# Patient Record
Sex: MALE | Race: WHITE | NOT HISPANIC OR LATINO | Employment: OTHER | URBAN - METROPOLITAN AREA
[De-identification: names, ages, dates, MRNs, and addresses within clinical notes are randomized per-mention and may not be internally consistent; named-entity substitution may affect disease eponyms.]

---

## 2018-08-13 ENCOUNTER — TRANSFERRED RECORDS (OUTPATIENT)
Dept: PHYSICAL THERAPY | Facility: CLINIC | Age: 78
End: 2018-08-13

## 2018-08-16 ENCOUNTER — THERAPY VISIT (OUTPATIENT)
Dept: PHYSICAL THERAPY | Facility: CLINIC | Age: 78
End: 2018-08-16
Payer: MEDICARE

## 2018-08-16 DIAGNOSIS — M25.522 PAIN IN JOINT INVOLVING UPPER ARM, LEFT: ICD-10-CM

## 2018-08-16 DIAGNOSIS — M54.12 CERVICAL RADICULOPATHY: Primary | ICD-10-CM

## 2018-08-16 PROCEDURE — G8981 BODY POS CURRENT STATUS: HCPCS | Mod: GP | Performed by: PHYSICAL THERAPIST

## 2018-08-16 PROCEDURE — 97112 NEUROMUSCULAR REEDUCATION: CPT | Mod: GP | Performed by: PHYSICAL THERAPIST

## 2018-08-16 PROCEDURE — G8982 BODY POS GOAL STATUS: HCPCS | Mod: GP | Performed by: PHYSICAL THERAPIST

## 2018-08-16 PROCEDURE — 97110 THERAPEUTIC EXERCISES: CPT | Mod: GP | Performed by: PHYSICAL THERAPIST

## 2018-08-16 PROCEDURE — 97161 PT EVAL LOW COMPLEX 20 MIN: CPT | Mod: GP | Performed by: PHYSICAL THERAPIST

## 2018-08-16 NOTE — PROGRESS NOTES
[facility] Initial Evaluation  Subjective:  Patient is a 77 year old male presenting with rehab cervical spine hpi. The history is provided by the patient. No  was used.   Lake Macias is a 77 year old male with a cervical spine condition.  Condition occurred with:  Insidious onset.  Condition occurred: at home.  This is a new condition  On or about 8/8/2018, I started to have pain in the upper arm and into the elbow/ forearm.  I don't remember anything I did.  I was lifting a water bucket out the rain barrel but didn't notice any pain. The MD feels that is it coming from my neck..    Site of Pain: dosen't start in the neck.  Radiates to:  Elbow left and upper arm left (moves to the UT, scapular).  Pain is described as aching and is constant and reported as 5/10 and 6/10.  Associated with: drop something from the arms. Pain is worse in the P.M. and worse during the night.  Symptoms are exacerbated by carrying, driving, lifting and lying down (turning the head ) and relieved by NSAID's (arm positioning).  Since onset symptoms are unchanged.  Special tests:  X-ray (negative).  Previous treatment: none.    General health as reported by patient is good.  Pertinent medical history includes:  Heart problems (malaria).  Medical allergies: yes (latex, codine).  Surgical history: left groin hernia, open heart (tri) 2009.  Current medications:  High blood pressure medication (statin).  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    Employment tasks: takes care of wife, house work, yard.    Barriers: house.    Red flags:  None as reported by the patient.                        Objective:  Standing Alignment:    Cervical/Thoracic:  Forward head and thoracic kyphosis increased (poor sitting posture)  Shoulder/UE:  Rounded shoulders  Lumbar:  Lordosis decr                Flexibility/Screens:   Positive screens:  Cervical and Thoracic (tightness)  Upper  Extremity:    Decreased left upper extremity flexibility at:  Pectoralis Major and Pectoralis Minor    Decreased right upper extremity flexibility present at:  Pectoralis Major and Pectoralis Minor    Spine:  Decreased left spine flexibility:  Scalenes; Upper Trap and Levator                    Cervical/Thoracic Evaluation    AROM:  AROM Cervical:    Flexion:          WFL no change  Extension:       Minimal and arm pain   Rotation:         Left: minimal and painful     Right: moderate  Side Bend:      Left:     Right:   AROM Thoracic:    Flexion:               Extension:          Minimal movement  Rotation:            Left: minimal-moderate movement     Right: minimal-moderate movement        Cervical Myotomes:    C1-2 (Neck Flex): Left:  5    Right: 5  C3 (neck side bend): Left: 5    Right: 5  C4 (shrug):  Left: 5    Right: 5  C5 (Deltoid):  Left: 5    Right: 5  C6 (Biceps):  Left: 5    Right: 5  C7 (Triceps):  Left: 5-    Right: 5  C8 (Thumb Ext): Left: 5    Right: 5  T1 (Intrinsics): Left: 5    Right: 5        Cervical Palpation:  : left first rib.  Tenderness present at Left:    Scalenes; Upper Trap; Levator and Facet      Cervical Stability/Joint Clearing:    Left positive at:1st Rib; Provocation and Mobility    Spinal Segmental Conclusions:    Level:  Hypo at T1, T2, T3, T4, T5 and T6                                                General     ROS    Assessment/Plan:    Patient is a 77 year old male with left arm pain  complaints.    Patient has the following significant findings with corresponding treatment plan.                Diagnosis 1:  Cervical radiculopathy/left arm pain  Pain -  manual therapy, self management, education, directional preference exercise and home program  Decreased ROM/flexibility - manual therapy, therapeutic exercise, therapeutic activity and home program  Decreased joint mobility - manual therapy, therapeutic exercise, therapeutic activity and home program  Decreased strength -  therapeutic exercise, therapeutic activities and home program  Impaired muscle performance - neuro re-education and home program  Decreased function - therapeutic activities and home program  Impaired posture - neuro re-education, therapeutic activities and home program  Evaluation ongoing     Therapy Evaluation Codes:   1) History comprised of:   Personal factors that impact the plan of care:      Living environment, Profession and caring for wife.    Comorbidity factors that impact the plan of care are:      Heart problems and heart surgery.     Medications impacting care: None.  2) Examination of Body Systems comprised of:   Body structures and functions that impact the plan of care:      Cervical spine, Elbow, Shoulder and Thoracic Spine.   Activity limitations that impact the plan of care are:      Driving, Dressing, Lifting, Reading/Computer work and Sleeping.  3) Clinical presentation characteristics are:   Stable/Uncomplicated.  4) Decision-Making    Low complexity using standardized patient assessment instrument and/or measureable assessment of functional outcome.  Cumulative Therapy Evaluation is: Low complexity.    Previous and current functional limitations:  (See Goal Flow Sheet for this information)    Short term and Long term goals: (See Goal Flow Sheet for this information)     Communication ability:  Patient appears to be able to clearly communicate and understand verbal and written communication and follow directions correctly.  Treatment Explanation - The following has been discussed with the patient:   RX ordered/plan of care  Possible risks and side effects  This patient would benefit from PT intervention to resume normal activities.   Rehab potential is good.    Frequency:  1 X week, once daily  Duration:  for 9 weeks  Discharge Plan:  Achieve all LTG.  Independent in home treatment program.    Please refer to the daily flowsheet for treatment today, total treatment time and time spent  performing 1:1 timed codes.

## 2018-08-16 NOTE — MR AVS SNAPSHOT
After Visit Summary   8/16/2018    Lake Macias    MRN: [id number]           Patient Information     Date Of Birth          1940        Visit Information        Provider Department      8/16/2018 2:50 PM Halima Corona PT [facility]        Today's Diagnoses     Cervical radiculopathy    -  1    Pain in joint involving upper arm, left           Follow-ups after your visit        Your next 10 appointments already scheduled     Aug 24, 2018 12:50 PM CDT   WILFRIDO Extremity with Lorelei Bateman [facility] Physical Therapy ([facility])    [address] [place] [address] [address] [place] [id number]   [phone]            Aug 31, 2018  1:30 PM CDT   WILFRIDO Extremity with Lorelei Bateman [facility] ([facility])    [address] [place] [address] [address] [place] [address]   [phone]            Sep 07, 2018  1:30 PM CDT   WILFRIDO Extremity with Lorelei Bateman [facility] Therapy ([facility])    [address] [place] [address] [address] [place] [address]   [phone]            Sep 14, 2018  1:30 PM CDT   WILFRIDO Extremity with Lorelei Bateman [facility] Therapy ([facility])    [address] [place] [address] [address] [place] [address]   [phone]              Who to contact     If you have questions or need follow up information about today's clinic visit or your schedule please contact [facility] PHYSICAL THERAPY directly at [phone].  Normal or non-critical lab and imaging results will be communicated to you by MyChart, letter or phone within 4 business days after the clinic has received the results. If you do not hear from us within 7 days, please  contact the clinic through [employer] or phone. If you have a critical or abnormal lab result, we will notify you by phone as soon as possible.  Submit refill requests through [employer] or call your pharmacy and they will forward the refill request to us. Please allow 3 business days for your refill to be completed.          Additional Information About Your Visit        Care EveryWhere ID     This is your Care EveryWhere ID. This could be used by other organizations to access your [facility] medical records  [id number]         Blood Pressure from Last 3 Encounters:   No data found for BP    Weight from Last 3 Encounters:   No data found for Wt              We Performed the Following     HC PT EVAL, LOW COMPLEXITY     WILFRIDO CERT REPORT     W[facility]O INITIAL EVAL REPORT     NEUROMUSCULAR RE-EDUCATION     THERAPEUTIC EXERCISES        Primary Care Provider Office Phone # Fax #    Boris Feliciano [phone]       [facility] [address]  [facility] [address]        Equal Access to Services     TAHMINA [facility] HospitalASHLEY : Hadii aad ku hadasho Soomaali, waaxda luqadaha, qaybta kaalmada adeegyada, waxay idiin hayaan adereggie garg . So [facility] [phone].    ATENCIÓN: Si habla español, tiene a fournier disposición servicios gratuitos de asistencia lingüística. Patricio al [phone].    We comply with applicable federal civil rights laws and [place] laws. We do not discriminate on the basis of race, color, national origin, age, disability, sex, sexual orientation, or gender identity.            Thank you!     Thank you for choosing [facility]ICAL THERAP[facility]  for your care. Our goal is always to provide you with excellent care. Hearing back from our patients is one way we can continue to improve our services. Please take a few minutes to complete the written survey that you may receive in the mail after your visit with us. Thank you!             Your Updated Medication List - Protect  others around you: Learn how to safely use, store and throw away your medicines at www.disposemymeds.org.      Notice  As of 8/16/2018 11:59 PM    You have not been prescribed any medications.

## 2018-08-18 PROBLEM — M25.522 PAIN IN JOINT INVOLVING UPPER ARM, LEFT: Status: ACTIVE | Noted: 2018-08-18

## 2018-08-18 PROBLEM — M54.12 CERVICAL RADICULOPATHY: Status: ACTIVE | Noted: 2018-08-18

## 2018-08-24 ENCOUNTER — THERAPY VISIT (OUTPATIENT)
Dept: PHYSICAL THERAPY | Facility: CLINIC | Age: 78
End: 2018-08-24
Payer: MEDICARE

## 2018-08-24 DIAGNOSIS — M54.12 CERVICAL RADICULOPATHY: ICD-10-CM

## 2018-08-24 DIAGNOSIS — M25.522 PAIN IN JOINT INVOLVING UPPER ARM, LEFT: ICD-10-CM

## 2018-08-24 PROCEDURE — 97112 NEUROMUSCULAR REEDUCATION: CPT | Mod: GP | Performed by: PHYSICAL THERAPY ASSISTANT

## 2018-08-24 PROCEDURE — 97110 THERAPEUTIC EXERCISES: CPT | Mod: GP | Performed by: PHYSICAL THERAPY ASSISTANT

## 2018-08-31 ENCOUNTER — THERAPY VISIT (OUTPATIENT)
Dept: PHYSICAL THERAPY | Facility: CLINIC | Age: 78
End: 2018-08-31
Payer: MEDICARE

## 2018-08-31 DIAGNOSIS — M54.12 CERVICAL RADICULOPATHY: ICD-10-CM

## 2018-08-31 DIAGNOSIS — M25.522 PAIN IN JOINT INVOLVING UPPER ARM, LEFT: ICD-10-CM

## 2018-08-31 PROCEDURE — 97110 THERAPEUTIC EXERCISES: CPT | Mod: GP | Performed by: PHYSICAL THERAPY ASSISTANT

## 2018-08-31 PROCEDURE — 97112 NEUROMUSCULAR REEDUCATION: CPT | Mod: GP | Performed by: PHYSICAL THERAPY ASSISTANT

## 2018-08-31 PROCEDURE — 97140 MANUAL THERAPY 1/> REGIONS: CPT | Mod: GP | Performed by: PHYSICAL THERAPY ASSISTANT

## 2018-09-14 ENCOUNTER — THERAPY VISIT (OUTPATIENT)
Dept: PHYSICAL THERAPY | Facility: CLINIC | Age: 78
End: 2018-09-14
Payer: MEDICARE

## 2018-09-14 DIAGNOSIS — M54.12 CERVICAL RADICULOPATHY: ICD-10-CM

## 2018-09-14 DIAGNOSIS — M25.522 PAIN IN JOINT INVOLVING UPPER ARM, LEFT: ICD-10-CM

## 2018-09-14 PROCEDURE — G8982 BODY POS GOAL STATUS: HCPCS | Mod: GP | Performed by: PHYSICAL THERAPIST

## 2018-09-14 PROCEDURE — 97110 THERAPEUTIC EXERCISES: CPT | Mod: GP | Performed by: PHYSICAL THERAPY ASSISTANT

## 2018-09-14 PROCEDURE — 97112 NEUROMUSCULAR REEDUCATION: CPT | Mod: GP | Performed by: PHYSICAL THERAPY ASSISTANT

## 2018-09-14 PROCEDURE — G8983 BODY POS D/C STATUS: HCPCS | Mod: GP | Performed by: PHYSICAL THERAPIST

## 2018-09-14 NOTE — PROGRESS NOTES
Subjective:  HPI                    Objective:  System    Physical Exam    General     ROS    Assessment/Plan:    DISCHARGE REPORT    Progress reporting period is from 8/16/18 to 9/14/18.      SUBJECTIVE  Subjective changes noted by patient:  Pt has been seen for 3 PT sessions reporting full recovery. Currently has no complaints of pain in neck or L arm. Pt has been consistent with HEP and improving posture.   Full performance of ADL's and household work.    Current Pain level: 0/10    Previous pain level was:  6/10     Changes in function:  Yes (See Goal flowsheet attached for changes in current functional level)     Adverse reaction to treatment or activity: None     OBJECTIVE  Changes noted in objective findings:   CROM has improved: all motions WNL and painfree.   Cervical myotomes normal.   No tenderness with palpation to UT/SO regions.   Improved posture, occasional fwd head with pt able to correct without cues, instructed pt to avoid crossing arms in front of body as that pulls the shoulders and head forward.

## 2018-09-14 NOTE — LETTER
[facility] PHYSICAL THERAPY  [address]  [place] [address]  [phone]    2018    Re: Lake Macias   :   1940  MRN:  [id number]   REFERRING PHYSICIAN:   Boris Feliciano    [facility] PHYSICAL THERAPY    Date of Initial Evaluation:  2018  Visits:  Rxs Used: 4  Reason for Referral:     Cervical radiculopathy  Pain in joint involving upper arm, left    DISCHARGE REPORT  Progress reporting period is from 18 to 18.      SUBJECTIVE  Subjective changes noted by patient:  Pt has been seen for 3 PT sessions reporting full recovery. Currently has no complaints of pain in neck or L arm. Pt has been consistent with HEP and improving posture.   Full performance of ADL's and household work.    Current Pain level: 0/10    Previous pain level was:  6/10     Changes in function:  Yes (See Goal flowsheet attached for changes in current functional level)     Adverse reaction to treatment or activity: None     OBJECTIVE  Changes noted in objective findings:   CROM has improved: all motions WNL and painfree.   Cervical myotomes normal.   No tenderness with palpation to UT/SO regions.   Improved posture, occasional fwd head with pt able to correct without cues, instructed pt to avoid crossing arms in front of body as that pulls the shoulders and head forward.      ASSESSMENT/PLAN  Updated problem list and treatment plan: Diagnosis 1:  Cervical radiculopathy with L upper extremity pain:  none  STG/LTGs have been met:  Yes (See Goal flow sheet completed today.)  Progress toward STG/LTGs have been made:  Yes (See Goal flow sheet completed today.)  Assessment of Progress: The patient's condition is improving.  Patient is meeting short term goals and is progressing towards long term goals.  Self Management Plans:  Patient is independent in a home treatment program.  Patient is independent in self  management of symptoms.I have re-evaluated this patient and find that the nature, scope, duration and intensity of   Re: Lake Macias   :   1940    the therapy is appropriate for the medical condition of the patient.  Lake continues to require the following intervention to meet STG and LT's:  PT intervention is no longer required to meet STG/LTG.    Recommendations:  This patient is ready to be discharged from therapy and continue their home treatment program.  The progress note/discharge summary was written in collaboration with and reviewed by the physical therapist.      Thank you for your referral.    INQUIRIES  Therapist: Zenia Jj PT  [facility]  [address] [address]  Phone: [phone]  Fax: [phone]

## 2018-09-14 NOTE — MR AVS SNAPSHOT
After Visit Summary   9/14/2018    Lake Macias    MRN: [id number]           Patient Information     Date Of Birth          1940        Visit Information        Provider Department      9/14/2018 1:30 PM Lorelei Bateman PTA [facility] Physical Therapy        Today's Diagnoses     Cervical radiculopathy        Pain in joint involving upper arm, left           Follow-ups after your visit        Who to contact     If you have questions or need follow up information about today's clinic visit or your schedule please contact [facility] directly at [phone].  Normal or non-critical lab and imaging results will be communicated to you by MyChart, letter or phone within 4 business days after the clinic has received the results. If you do not hear from us within 7 days, please contact the clinic through MyChart or phone. If you have a critical or abnormal lab result, we will notify you by phone as soon as possible.  Submit refill requests through [employer] or call your pharmacy and they will forward the refill request to us. Please allow 3 business days for your refill to be completed.          Additional Information About Your Visit        Care EveryWhere ID     This is your Care EveryWhere ID. This could be used by other organizations to access your [facility] medical records  [id number]         Blood Pressure from Last 3 Encounters:   No data found for BP    Weight from Last 3 Encounters:   No data found for Wt              We Performed the Following     NEUROMUSCULAR RE-EDUCATION     THERAPEUTIC EXERCISES        Primary Care Provider Office Phone # Fax #    Boris MCCLELLAN Jodie [phone]       [address] [address]        Equal Access to Services     TAHMINA ELKINS : Edie Restrepo, lalito carnes, karla talley  zackery silvamimishivam barretoAjaacelestina ah. So [facility] [phone].    ATENCIÓN: Si habla primitivo, tiene a fournier disposición servicios gratuitos de asistencia lingüística. Patricio al [phone].    We comply with applicable federal civil rights laws and [place] laws. We do not discriminate on the basis of race, color, national origin, age, disability, sex, sexual orientation, or gender identity.            Thank you!     Thank you for choosing [facility] PHYSICAL THERAP[facility]  for your care. Our goal is always to provide you with excellent care. Hearing back from our patients is one way we can continue to improve our services. Please take a few minutes to complete the written survey that you may receive in the mail after your visit with us. Thank you!             Your Updated Medication List - Protect others around you: Learn how to safely use, store and throw away your medicines at www.disposemymeds.org.      Notice  As of 9/14/2018  2:53 PM    You have not been prescribed any medications.

## 2018-09-14 NOTE — PROGRESS NOTES
Subjective:  HPI                    Objective:  System    Physical Exam    General     ROS    Assessment/Plan:    ASSESSMENT/PLAN  Updated problem list and treatment plan: Diagnosis 1:  Cervical radiculopathy with L upper extremity pain:  none  STG/LTGs have been met:  Yes (See Goal flow sheet completed today.)  Progress toward STG/LTGs have been made:  Yes (See Goal flow sheet completed today.)  Assessment of Progress: The patient's condition is improving.  Patient is meeting short term goals and is progressing towards long term goals.  Self Management Plans:  Patient is independent in a home treatment program.  Patient is independent in self management of symptoms.  I have re-evaluated this patient and find that the nature, scope, duration and intensity of the therapy is appropriate for the medical condition of the patient.  Lake continues to require the following intervention to meet STG and LT's:  PT intervention is no longer required to meet STG/LTG.    Recommendations:  This patient is ready to be discharged from therapy and continue their home treatment program.  The progress note/discharge summary was written in collaboration with and reviewed by the physical therapist.    Please refer to the daily flowsheet for treatment today, total treatment time and time spent performing 1:1 timed codes.

## 2022-03-21 NOTE — LETTER
DEPARTMENT OF HEALTH AND HUMAN SERVICES  CENTERS FOR MEDICARE & MEDICAID SERVICES    PLAN/UPDATED PLAN OF PROGRESS FOR OUTPATIENT REHABILITATION    PATIENTS NAME:  Lake Macias   : 1940  PROVIDER NUMBER:    [id number]    HICN:  [id number]    PROVIDER NAME: [place] [facility] PHYSICAL [facility]ERAPY    MEDICAL RECORD NUMBER: [id number]     START OF CARE DATE:  SOC Date: 18   TYPE:  PT    PRIMARY/TREATMENT DIAGNOSIS: (Pertinent Medical Diagnosis)   Cervical radiculopathy  Pain in joint involving upper arm, left    VISITS FROM START OF CARE:  Rxs Used: 1     [facility] Initial Evaluation    Subjective:  Patient is a 77 year old male presenting with rehab cervical spine hpi. The history is provided by the patient. No  was used.   Lake Macias is a 77 year old male with a cervical spine condition.  Condition occurred with:  Insidious onset.  Condition occurred: at home.  This is a new condition  On or about 2018, I started to have pain in the upper arm and into the elbow/ forearm.  I don't remember anything I did.  I was lifting a water bucket out the rain barrel but didn't notice any pain. The MD feels that is it coming from my neck..    Site of Pain: dosen't start in the neck.  Radiates to:  Elbow left and upper arm left (moves to the UT, scapular).  Pain is described as aching and is constant and reported as 5/10 and 6/10.  Associated with: drop something from the arms. Pain is worse in the P.M. and worse during the night.  Symptoms are exacerbated by carrying, driving, lifting and lying down (turning the head ) and relieved by NSAID's (arm positioning).  Since onset symptoms are unchanged.  Special tests:  X-ray (negative).  Previous treatment: none.    General health as reported by patient is good.  Pertinent medical history includes:  Heart problems (malaria).  Medical allergies: yes (latex, codine).  Surgical history: left  MD at the bedside attempting to assess left shoulder and manipulate   groin hernia, open heart (tri) 2009.  Current medications:  High blood pressure medication (statin).  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    Employment tasks: takes care of wife, house work, yard.  Barriers: house.  Red flags:  None as reported by the patient.                Objective:  Standing Alignment:    Cervical/Thoracic:  Forward head and thoracic kyphosis increased (poor sitting posture)  Shoulder/UE:  Rounded shoulders  Lumbar:  Lordosis decr  Flexibility/Screens:   Positive screens:  Cervical and Thoracic (tightness)  PATIENTS NAME:  Lake Macias   : 1940    Upper Extremity:    Decreased left upper extremity flexibility at:  Pectoralis Major and Pectoralis Minor  Decreased right upper extremity flexibility present at:  Pectoralis Major and Pectoralis Minor  Spine:  Decreased left spine flexibility:  Scalenes; Upper Trap and Levator      Cervical/Thoracic Evaluation  AROM:  AROM Cervical:  Flexion:          WFL no change  Extension:       Minimal and arm pain   Rotation:         Left: minimal and painful     Right: moderate  Side Bend:      Left:     Right:   AROM Thoracic:  Flexion:               Extension:          Minimal movement  Rotation:            Left: minimal-moderate movement     Right: minimal-moderate movement    Cervical Myotomes:    C1-2 (Neck Flex): Left:  5    Right: 5  C3 (neck side bend): Left: 5    Right: 5  C4 (shrug):  Left: 5    Right: 5  C5 (Deltoid):  Left: 5    Right: 5  C6 (Biceps):  Left: 5    Right: 5  C7 (Triceps):  Left: 5-    Right: 5  C8 (Thumb Ext): Left: 5    Right: 5  T1 (Intrinsics): Left: 5    Right: 5  Cervical Palpation:  : left first rib.  Tenderness present at Left:    Scalenes; Upper Trap; Levator and Facet  Cervical Stability/Joint Clearing:    Left positive at:1st Rib; Provocation and Mobility  Spinal Segmental Conclusions:    Level:  Hypo at T1, T2, T3, T4, T5 and T6    Assessment/Plan:    Patient is a 77 year old  male with left arm pain  complaints.    Patient has the following significant findings with corresponding treatment plan.                Diagnosis 1:  Cervical radiculopathy/left arm pain  Pain -  manual therapy, self management, education, directional preference exercise and home program  Decreased ROM/flexibility - manual therapy, therapeutic exercise, therapeutic activity and home program  Decreased joint mobility - manual therapy, therapeutic exercise, therapeutic activity and home program  Decreased strength - therapeutic exercise, therapeutic activities and home program  Impaired muscle performance - neuro re-education and home program  Decreased function - therapeutic activities and home program  Impaired posture - neuro re-education, therapeutic activities and home program  Evaluation ongoing       PATIENTS NAME:  Lake Macias   : 1940    Therapy Evaluation Codes:   1) History comprised of:   Personal factors that impact the plan of care:      Living environment, Profession and caring for wife.    Comorbidity factors that impact the plan of care are:      Heart problems and heart surgery.     Medications impacting care: None.  2) Examination of Body Systems comprised of:   Body structures and functions that impact the plan of care:      Cervical spine, Elbow, Shoulder and Thoracic Spine.   Activity limitations that impact the plan of care are:      Driving, Dressing, Lifting, Reading/Computer work and Sleeping.  3) Clinical presentation characteristics are:   Stable/Uncomplicated.  4) Decision-Making    Low complexity using standardized patient assessment instrument and/or measureable assessment of functional outcome.  Cumulative Therapy Evaluation is: Low complexity.    Previous and current functional limitations:  (See Goal Flow Sheet for this information)    Short term and Long term goals: (See Goal Flow Sheet for this information)     Communication ability:  Patient appears to be able to  "clearly communicate and understand verbal and written communication and follow directions correctly.  Treatment Explanation - The following has been discussed with the patient:   RX ordered/plan of care  Possible risks and side effects  This patient would benefit from PT intervention to resume normal activities.   Rehab potential is good.    Frequency:  1 X week, once daily  Duration:  for 9 weeks  Discharge Plan:  Achieve all LTG.  Independent in home treatment program.    Caregiver Signature/Credentials _____________________________ Date ________       Treating Provider: Halima Corona, PT     I have reviewed and certified the need for these services and plan of treatment while under my care.      PHYSICIAN'S SIGNATURE:   _________________________________________  Date___________   Boris Feliciano MD    Certification period:  Beginning of Cert date period: 08/16/18 to  End of Cert period date: 11/13/18   Functional Level Progress Report: Please see attached \"Goal Flow sheet for Functional level.\"  ____X____ Continue Services or       ________ DC Services              Service dates: From  SOC Date: 08/16/18 date to present                         "

## 2022-04-21 ENCOUNTER — TRANSFERRED RECORDS (OUTPATIENT)
Dept: HEALTH INFORMATION MANAGEMENT | Facility: CLINIC | Age: 82
End: 2022-04-21

## 2022-04-21 LAB
CHOLESTEROL (EXTERNAL): 155 MG/DL (ref 0–200)
CREATININE (EXTERNAL): 0.8 MG/DL (ref 0.7–1.2)
GFR ESTIMATED (EXTERNAL): 90 ML/MIN/1.73M2
HBA1C MFR BLD: 5.7 % (ref 4–6)
HDLC SERPL-MCNC: 44 MG/DL
HEP C HIM: NORMAL
LDL CHOLESTEROL CALCULATED (EXTERNAL): 80 MG/DL
NON HDL CHOLESTEROL (EXTERNAL): 111 MG/DL
TRIGLYCERIDES (EXTERNAL): 155 MG/DL

## 2023-02-14 ENCOUNTER — TRANSFERRED RECORDS (OUTPATIENT)
Dept: HEALTH INFORMATION MANAGEMENT | Facility: CLINIC | Age: 83
End: 2023-02-14

## 2023-04-06 ENCOUNTER — TRANSCRIBE ORDERS (OUTPATIENT)
Dept: OTHER | Age: 83
End: 2023-04-06

## 2023-04-06 DIAGNOSIS — M43.16 SPONDYLOLISTHESIS, LUMBAR REGION: Primary | ICD-10-CM

## 2023-04-10 NOTE — TELEPHONE ENCOUNTER
SPINE PATIENTS - NEW PROTOCOL PREVISIT    RECORDS RECEIVED FROM: external   REASON FOR VISIT: Spondylolisthesis, lumbar region   Date of Appt: 4/14/23   NOTES (FOR ALL VISITS) STATUS DETAILS   OFFICE NOTE from referring provider Received Dr Milind Mota @ [facility] Neurosurgery:  3/23/23   MEDICATION LIST Received    IMAGING  (FOR ALL VISITS)     MRI (HEAD, NECK, SPINE) [facility]  MRI Lumbar Spine 3/4/23   XRAY (SPINE) *NEUROSURGERY* [facility]:  XR Lumbar Spine 3/23/23

## 2023-04-12 ENCOUNTER — TELEPHONE (OUTPATIENT)
Dept: NEUROSURGERY | Facility: CLINIC | Age: 83
End: 2023-04-12
Payer: COMMERCIAL

## 2023-04-12 NOTE — TELEPHONE ENCOUNTER
[facility] Call Center    Phone Message    May a detailed message be left on voicemail: yes     Reason for Call: Other: Patient returned call stating that [facility].    CCN2  Referral number [id number]  [facility] phone number [phone]    Action Taken: Message routed to:  [facility] ([facility]): [facility] Neuro[facility]    Travel Screening: Not Applicable

## 2023-04-13 DIAGNOSIS — M43.16 SPONDYLOLISTHESIS, LUMBAR REGION: Primary | ICD-10-CM

## 2023-04-14 ENCOUNTER — OFFICE VISIT (OUTPATIENT)
Dept: NEUROSURGERY | Facility: CLINIC | Age: 83
End: 2023-04-14
Payer: COMMERCIAL

## 2023-04-14 ENCOUNTER — PREP FOR PROCEDURE (OUTPATIENT)
Dept: NEUROSURGERY | Facility: CLINIC | Age: 83
End: 2023-04-14

## 2023-04-14 ENCOUNTER — PRE VISIT (OUTPATIENT)
Dept: NEUROSURGERY | Facility: CLINIC | Age: 83
End: 2023-04-14
Payer: COMMERCIAL

## 2023-04-14 VITALS
SYSTOLIC BLOOD PRESSURE: 133 MMHG | DIASTOLIC BLOOD PRESSURE: 80 MMHG | OXYGEN SATURATION: 97 % | HEART RATE: 79 BPM | WEIGHT: 181 LBS

## 2023-04-14 DIAGNOSIS — M48.062 LUMBAR STENOSIS WITH NEUROGENIC CLAUDICATION: ICD-10-CM

## 2023-04-14 DIAGNOSIS — M43.16 SPONDYLOLISTHESIS, LUMBAR REGION: Primary | ICD-10-CM

## 2023-04-14 DIAGNOSIS — M54.41 ACUTE BILATERAL LOW BACK PAIN WITH BILATERAL SCIATICA: Primary | ICD-10-CM

## 2023-04-14 DIAGNOSIS — M54.42 ACUTE BILATERAL LOW BACK PAIN WITH BILATERAL SCIATICA: Primary | ICD-10-CM

## 2023-04-14 DIAGNOSIS — M54.42 ACUTE BILATERAL LOW BACK PAIN WITH BILATERAL SCIATICA: ICD-10-CM

## 2023-04-14 DIAGNOSIS — M54.41 ACUTE BILATERAL LOW BACK PAIN WITH BILATERAL SCIATICA: ICD-10-CM

## 2023-04-14 PROCEDURE — 99204 OFFICE O/P NEW MOD 45 MIN: CPT | Performed by: PHYSICIAN ASSISTANT

## 2023-04-14 RX ORDER — TOLTERODINE 4 MG/1
1 CAPSULE, EXTENDED RELEASE ORAL DAILY
COMMUNITY
Start: 2022-04-21

## 2023-04-14 RX ORDER — NITROGLYCERIN 0.4 MG/1
0.4 TABLET SUBLINGUAL PRN
COMMUNITY

## 2023-04-14 RX ORDER — ROSUVASTATIN CALCIUM 10 MG/1
1 TABLET, COATED ORAL AT BEDTIME
COMMUNITY
Start: 2023-02-28

## 2023-04-14 RX ORDER — ATENOLOL 25 MG/1
1 TABLET ORAL DAILY
COMMUNITY
Start: 2023-02-25

## 2023-04-14 RX ORDER — LOSARTAN POTASSIUM 25 MG/1
25 TABLET ORAL DAILY
COMMUNITY

## 2023-04-14 RX ORDER — LORATADINE 10 MG/1
10 TABLET ORAL DAILY
COMMUNITY

## 2023-04-14 ASSESSMENT — PAIN SCALES - GENERAL: PAINLEVEL: MODERATE PAIN (4)

## 2023-04-14 NOTE — PATIENT INSTRUCTIONS
"Dr Howell has recommended surgery (bilateral L4/5 minimally invasive microdiscectomy) and you have indicated a willingness to move forward with scheduling.    A member of our scheduling team will call you soon to coordinate a date and time, and will help arrange a visit with our anesthesia doctors in \"[facility]\" to screen for medical problems and to check blood tests prior to surgery.  Please be aware, we are currently scheduling about 4-6 weeks out.  Once a surgery date has been established our office will mail surgical information to your home.  If you have questions, please call Dr Lynda Mayfield's RN Care Coordinator at [phone]       [facility]    "

## 2023-04-14 NOTE — LETTER
4/14/2023       RE: Lake Macias  [address]  [facility] [address]     Dear Colleague,    Thank you for referring your patient, Lake Macias, to the [facility] NEUROSURGERY CLINIC [place] at [facility]. Please see a copy of my visit note below.        Neurosurgery Clinic Note    Chief Complaint: low back pain with sciatica    History of Present Illness:  It was a pleasure to evaluate Lake Macias in clinic today at the kind referral of Milind Mota MD  [address]  [place] [address].    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      Patient notes he started having more low back pain when he turned 83 y/o (end of November).  His more chronic back pain turned sharp.  He notes this getting worse as he moves around throughout the day.  He endorses pain in his buttocks and hips bilaterally, but denies any pain radiating down his thighs or legs.  He denies leg weakness or numbness/tingling.  The pain ranges from aching to sharp. Sitting too long hurts.  Leaning over feels better.  He finds himself naturally progressing to leaning over throughout the day.  He is very limited on how far he can walk--maybe 1/2 jasmyn before needing to rest because of pain.  He rests for 15-30 minutes and then can resume. He denies any falls or other trauma. He has not had any prior lumbar surgeries.    In the last week he is getting numbness/tingling in both arms/hands and all fingers.  He denies dropping things or using his fingers.  He denies balance issues, weakness in his BUE or bowel/bladder issues.  He denies neck pain, but does feel neck stiffness.  His records indicate C4-7 DDD with mild central stenosis at  C4-6 in 2015 on MRI (important for anesthesia to know for intubation purposes).      He takes care of his wife who is on hospice care in living room.      He declined PT prior to getting MRI because he did not want to damage anything more; he is as active as he can be on is own right now.     No past medical history on file.    No past surgical history on file.    Social History     Socioeconomic History    Marital status:        family history is not on file.       IMAGING   Imaging independently reviewed.    Lumbar MRI  3/4/23 - transitional anatomy--last full disc space is L5/S1.  L4/5 anterolisthesis grade 1, severe CCS and FS with disc osteophyte complex.               Lumbar XR dynamic 3/23/23 - no substantial dynamic motion in lumbar spine            Nicotine Usage:  No     Physical Exam   /80   Pulse 79   Wt 82.1 kg (181 lb)   SpO2 97%     Constitutional: Appears well-developed and well-nourished. Cooperative. No acute distress.   HENT:   Head: Normocephalic and atraumatic.   Eyes: Conjunctivae are normal.  Neck: Neck supple. No tracheal deviation present.  Cardiovascular: Normal rate and regular rhythm.    Pulmonary/Chest: Effort normal and breath sounds normal.  Abdominal: Exhibits no obvious distension.   Musculoskeletal: Able to move all extremities.  No involuntary motor movements. No C/T/L spine tenderness to palpation.    Lumbar flexion-extension range of motion: stiffness with ROM, but sharp pain not reproduced today with F/E, lateral bending or facet loading. Negative straight leg. Negative Faver.   Cervical flexion/extension range of motion: FROM w/o pain. N/T not present today.  Skin: Skin is warm, dry and intact.   Psychiatric: Normal mood and affect. Speech is normal and behavior is normal.    Neurological:  Alert, NAD, and oriented to person, place, and time.   No cranial nerve deficit   Gait: normal gait; +imbalance with tandem walking    Strength (L/R)  5/5 Deltoid  5/5  Bicep  5/5 Tricep  5/5 Handgrip    5/5 Hip Flexion  5/5 Knee Extension  5/5 Ankle Dorsiflexion  5/5 Extensor Hallucis Longus  5/5 Plantar Flexion    Reflexes (L/R)  1+1+ Bicep  1+/1+ Brachioradialis  2+/2+ Patellar  1+/1+ Ankle    No Lhermitte's  No Spurling's  No Juan C's   No ankle clonus    Sensation: SILT  No SI Joint TTP or GT bursal TTP bilaterally.    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     His symptoms and imaging are consistent with lumbago with bilateral sciatica and neurogenic claudication.  MRI reveals transitional anatomy--last full disc space is L5/S1.  L4/5 anterolisthesis grade 1, severe CCS and FS.  No significant motion on dynamic XR.  He has a lumbar CT scheduled for Monday which he was instructed to keep so we can look at the bony anatomy and r/o pars fractures at L4/5 as the reason for his slip.  At this time, Dr. Howell is proposing a bilateral MIS microdiscectomy for decompression.  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      He also has a history of cervical stenosis on MRI report in 2015 and has had some transient n/t in his UE (mostly hands/fingers) recently.  He is imbalanced with tandem walking, but has no other concerns for cervical myelopathy.  I note this as this will be important for anesthesia to be aware of for purposes of intubation for surgery.     PLAN:    Lumbar decompression surgery as stated above.      [facility]  Will need to get those images obtained for Dr. Howell to review.      Patient seen and discussed with Dr. Howell.    I spent 45 minutes spent in patient care, independent review and interpretation of medical  records/imaging, reviewing old records, and discussed this case with another health care provider.       Tracie Cormier PA-C  Department of Neurosurgery  Office: [phone]            Again, thank you for allowing me to participate in the care of your patient.      Sincerely,    Allyson Howell MD

## 2023-04-14 NOTE — PROGRESS NOTES
Neurosurgery Clinic Note    Chief Complaint: low back pain with sciatica    History of Present Illness:  It was a pleasure to evaluate Lake Macias in clinic today at the kind referral of Milind Mota MD  [address] [place] [id number].    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      Patient notes he started having more low back pain when he turned 81 y/o (end of November).  His more chronic back pain turned sharp.  He notes this getting worse as he moves around throughout the day.  He endorses pain in his buttocks and hips bilaterally, but denies any pain radiating down his thighs or legs.  He denies leg weakness or numbness/tingling.  The pain ranges from aching to sharp. Sitting too long hurts.  Leaning over feels better.  He finds himself naturally progressing to leaning over throughout the day.  He is very limited on how far he can walk--maybe 1/2 jasmyn before needing to rest because of pain.  He rests for 15-30 minutes and then can resume. He denies any falls or other trauma. He has not had any prior lumbar surgeries.    In the last week he is getting numbness/tingling in both arms/hands and all fingers.  He denies dropping things or using his fingers.  He denies balance issues, weakness in his BUE or bowel/bladder issues.  He denies neck pain, but does feel neck stiffness.  His records indicate C4-7 DDD with mild central stenosis at C4-6 in 2015 on MRI (important for anesthesia to know for intubation purposes).      He takes care of his wife who is on hospice care in living room.      He declined PT prior to getting MRI because he did not want to damage anything more; he is as active as he can be on is own right now.     No past medical history on  file.    No past surgical history on file.    Social History     Socioeconomic History    Marital status:        family history is not on file.       IMAGING   Imaging independently reviewed.    Lumbar MRI  3/4/23 - transitional anatomy--last full disc space is L5/S1.  L4/5 anterolisthesis grade 1, severe CCS and FS with disc osteophyte complex.               Lumbar XR dynamic 3/23/23 - no substantial dynamic motion in lumbar spine            Nicotine Usage:  No     Physical Exam   /80   Pulse 79   Wt 82.1 kg (181 lb)   SpO2 97%     Constitutional: Appears well-developed and well-nourished. Cooperative. No acute distress.   HENT:   Head: Normocephalic and atraumatic.   Eyes: Conjunctivae are normal.  Neck: Neck supple. No tracheal deviation present.  Cardiovascular: Normal rate and regular rhythm.    Pulmonary/Chest: Effort normal and breath sounds normal.  Abdominal: Exhibits no obvious distension.   Musculoskeletal: Able to move all extremities.  No involuntary motor movements. No C/T/L spine tenderness to palpation.    Lumbar flexion-extension range of motion: stiffness with ROM, but sharp pain not reproduced today with F/E, lateral bending or facet loading. Negative straight leg. Negative Faver.   Cervical flexion/extension range of motion: FROM w/o pain. N/T not present today.  Skin: Skin is warm, dry and intact.   Psychiatric: Normal mood and affect. Speech is normal and behavior is normal.    Neurological:  Alert, NAD, and oriented to person, place, and time.   No cranial nerve deficit   Gait: normal gait; +imbalance with tandem walking    Strength (L/R)  5/5 Deltoid  5/5 Bicep  5/5 Tricep  5/5 Handgrip    5/5 Hip Flexion  5/5 Knee Extension  5/5 Ankle Dorsiflexion  5/5 Extensor Hallucis Longus  5/5 Plantar Flexion    Reflexes (L/R)  1+1+ Bicep  1+/1+ Brachioradialis  2+/2+ Patellar  1+/1+ Ankle    No Lhermitte's  No Spurling's  No Juan C's   No ankle clonus    Sensation: SILT  No SI Joint  TTP or GT bursal TTP bilaterally.    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     His symptoms and imaging are consistent with lumbago with bilateral sciatica and neurogenic claudication.  MRI reveals transitional anatomy--last full disc space is L5/S1.  L4/5 anterolisthesis grade 1, severe CCS and FS.  No significant motion on dynamic XR.  He has a lumbar CT scheduled for Monday which he was instructed to keep so we can look at the bony anatomy and r/o pars fractures at L4/5 as the reason for his slip.  At this time, Dr. Howell is proposing a bilateral MIS microdiscectomy for decompression.  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      He also has a history of cervical stenosis on MRI report in 2015 and has had some transient n/t in his UE (mostly hands/fingers) recently.  He is imbalanced with tandem walking, but has no other concerns for cervical myelopathy.  I note this as this will be important for anesthesia to be aware of for purposes of intubation for surgery.     PLAN:    Lumbar decompression surgery as stated above.      [facility]  Will need to get those images obtained for Dr. Howell to review.      Patient seen and discussed with Dr. Howell.    I spent 45 minutes spent in patient care, independent review and interpretation of medical records/imaging, reviewing old records, and discussed this case with another health care provider.       Tracie Cormier PA-C  Department of Neurosurgery  Office: [phone]    I have seen this patient with the resident and formulated a plan and agree with this note.  AMP

## 2023-04-19 ENCOUNTER — TELEPHONE (OUTPATIENT)
Dept: NEUROSURGERY | Facility: CLINIC | Age: 83
End: 2023-04-19
Payer: COMMERCIAL

## 2023-04-19 NOTE — TELEPHONE ENCOUNTER
Phoned patient to schedule surgery with Dr Howell. I left him my direct number to call back at his convenience. [phone]

## 2023-04-20 RX ORDER — GABAPENTIN 100 MG/1
100 CAPSULE ORAL
Status: CANCELLED | OUTPATIENT
Start: 2023-04-20

## 2023-04-20 RX ORDER — CEFAZOLIN SODIUM/WATER 2 G/20 ML
2 SYRINGE (ML) INTRAVENOUS SEE ADMIN INSTRUCTIONS
Status: CANCELLED | OUTPATIENT
Start: 2023-04-20

## 2023-04-20 RX ORDER — CEFAZOLIN SODIUM/WATER 2 G/20 ML
2 SYRINGE (ML) INTRAVENOUS
Status: CANCELLED | OUTPATIENT
Start: 2023-04-20

## 2023-04-20 NOTE — TELEPHONE ENCOUNTER
Patient is scheduled for surgery with Dr. Howell    Spoke with: Patient    Date of Surgery: 5/15/23    Location: [place]    Post op: 2 weeks with PA    H&P: Scheduled with PAC 4/28/23    Additional imaging/appointments: N/A    Surgery packet: Mailed to patient    Additional comments: N/A        Dominga Alvarez MA on 4/20/2023 at 2:47 PM

## 2023-04-21 NOTE — TELEPHONE ENCOUNTER
FUTURE VISIT INFORMATION      SURGERY INFORMATION:    Date: 5/15/23    Location: [facility] or    Surgeon:  Allyson Howell MD    Anesthesia Type:  general    Procedure: Minimally invasive Lumbar 4/5 bilateral microdiscectomy    Consult: ov 23    RECORDS REQUESTED FROM:       Primary Care Provider: Boris Feliciano    Most recent EKG+ Tracin20- [facility]

## 2023-04-24 ENCOUNTER — PATIENT OUTREACH (OUTPATIENT)
Dept: NEUROSURGERY | Facility: CLINIC | Age: 83
End: 2023-04-24
Payer: COMMERCIAL

## 2023-04-24 NOTE — PROGRESS NOTES
Pre-op packet sent via US Mail with instructions to call upon receipt.    Procedure:  L4/5 MIS [place]/Microdisc    PAC: 4/28/23 @ 1330  DOS: 5/15/23 @ 0730

## 2023-04-28 ENCOUNTER — LAB (OUTPATIENT)
Dept: LAB | Facility: CLINIC | Age: 83
End: 2023-04-28
Payer: COMMERCIAL

## 2023-04-28 ENCOUNTER — ANCILLARY PROCEDURE (OUTPATIENT)
Dept: ULTRASOUND IMAGING | Facility: CLINIC | Age: 83
End: 2023-04-28
Payer: COMMERCIAL

## 2023-04-28 ENCOUNTER — PRE VISIT (OUTPATIENT)
Dept: SURGERY | Facility: CLINIC | Age: 83
End: 2023-04-28

## 2023-04-28 ENCOUNTER — ANESTHESIA EVENT (OUTPATIENT)
Dept: SURGERY | Facility: CLINIC | Age: 83
End: 2023-04-28

## 2023-04-28 ENCOUNTER — OFFICE VISIT (OUTPATIENT)
Dept: SURGERY | Facility: CLINIC | Age: 83
End: 2023-04-28
Payer: COMMERCIAL

## 2023-04-28 VITALS
WEIGHT: 178 LBS | TEMPERATURE: 98.5 F | SYSTOLIC BLOOD PRESSURE: 126 MMHG | BODY MASS INDEX: 26.36 KG/M2 | DIASTOLIC BLOOD PRESSURE: 78 MMHG | OXYGEN SATURATION: 98 % | HEART RATE: 95 BPM | RESPIRATION RATE: 16 BRPM | HEIGHT: 69 IN

## 2023-04-28 DIAGNOSIS — M54.42 ACUTE BILATERAL LOW BACK PAIN WITH BILATERAL SCIATICA: ICD-10-CM

## 2023-04-28 DIAGNOSIS — Z01.818 PREOP EXAMINATION: Primary | ICD-10-CM

## 2023-04-28 DIAGNOSIS — M54.41 ACUTE BILATERAL LOW BACK PAIN WITH BILATERAL SCIATICA: ICD-10-CM

## 2023-04-28 DIAGNOSIS — I65.23 BILATERAL CAROTID ARTERY STENOSIS: ICD-10-CM

## 2023-04-28 DIAGNOSIS — R73.03 PREDIABETES: ICD-10-CM

## 2023-04-28 DIAGNOSIS — Z01.818 PREOP EXAMINATION: ICD-10-CM

## 2023-04-28 LAB
ANION GAP SERPL CALCULATED.3IONS-SCNC: 10 MMOL/L (ref 7–15)
BUN SERPL-MCNC: 20.8 MG/DL (ref 8–23)
CALCIUM SERPL-MCNC: 9.9 MG/DL (ref 8.8–10.2)
CHLORIDE SERPL-SCNC: 100 MMOL/L (ref 98–107)
CREAT SERPL-MCNC: 0.99 MG/DL (ref 0.67–1.17)
DEPRECATED HCO3 PLAS-SCNC: 25 MMOL/L (ref 22–29)
ERYTHROCYTE [DISTWIDTH] IN BLOOD BY AUTOMATED COUNT: 12.5 % (ref 10–15)
GFR SERPL CREATININE-BSD FRML MDRD: 77 ML/MIN/1.73M2
GLUCOSE SERPL-MCNC: 101 MG/DL (ref 70–99)
HBA1C MFR BLD: 6 %
HCT VFR BLD AUTO: 39.6 % (ref 40–53)
HGB BLD-MCNC: 13.4 G/DL (ref 13.3–17.7)
MCH RBC QN AUTO: 32.2 PG (ref 26.5–33)
MCHC RBC AUTO-ENTMCNC: 33.8 G/DL (ref 31.5–36.5)
MCV RBC AUTO: 95 FL (ref 78–100)
PLATELET # BLD AUTO: 250 10E3/UL (ref 150–450)
POTASSIUM SERPL-SCNC: 4.4 MMOL/L (ref 3.4–5.3)
RBC # BLD AUTO: 4.16 10E6/UL (ref 4.4–5.9)
SODIUM SERPL-SCNC: 135 MMOL/L (ref 136–145)
WBC # BLD AUTO: 9.9 10E3/UL (ref 4–11)

## 2023-04-28 PROCEDURE — 80048 BASIC METABOLIC PNL TOTAL CA: CPT | Performed by: PATHOLOGY

## 2023-04-28 PROCEDURE — 99205 OFFICE O/P NEW HI 60 MIN: CPT

## 2023-04-28 PROCEDURE — 83036 HEMOGLOBIN GLYCOSYLATED A1C: CPT | Performed by: PATHOLOGY

## 2023-04-28 PROCEDURE — 93880 EXTRACRANIAL BILAT STUDY: CPT | Performed by: RADIOLOGY

## 2023-04-28 PROCEDURE — 85027 COMPLETE CBC AUTOMATED: CPT | Performed by: PATHOLOGY

## 2023-04-28 PROCEDURE — 36415 COLL VENOUS BLD VENIPUNCTURE: CPT | Performed by: PATHOLOGY

## 2023-04-28 ASSESSMENT — PAIN SCALES - GENERAL: PAINLEVEL: MODERATE PAIN (4)

## 2023-04-28 ASSESSMENT — LIFESTYLE VARIABLES: TOBACCO_USE: 0

## 2023-04-28 ASSESSMENT — COPD QUESTIONNAIRES: COPD: 0

## 2023-04-28 ASSESSMENT — ENCOUNTER SYMPTOMS
ORTHOPNEA: 0
SEIZURES: 0

## 2023-04-28 NOTE — H&P
Pre-Operative H & P     CC:  Preoperative exam to assess for increased cardiopulmonary risk while undergoing surgery and anesthesia.    Date of Encounter: 4/28/2023  Primary Care Physician:  Boris Feliciano     Reason for visit:   Encounter Diagnoses   Name Primary?     Preop examination Yes     Acute bilateral low back pain with bilateral sciatica      Bilateral carotid artery stenosis      Prediabetes        HPI  Lake Macias is a 82 year old male who presents for pre-operative H & P in preparation for  Procedure Information     Date/Time: 5/15/2023     Procedure: Minimally invasive Lumbar 4/5 bilateral microdiscectomy    Anesthesia type: General    Pre-op diagnosis: Acute bilateral low back pain with bilateral sciatica    Location: [facility]    Providers: Allyson Howell MD          Lake Macias is an 83 y/o male with a PMH significant for HTN, CABG x 3 (2009), left internal carotid artery 100% occlusion, KAVEH, prediabetes, and bladder dysfunction who has chronic low back pain with sciatica. Patient notes he started having more low back pain at the end of November. His more chronic back pain turned sharp.  He notes this is getting worse as he moves around throughout the day. He consulted with Dr. Howell on 4/14/23 and the above surgery was recommended for further management.    History is obtained from the patient and chart review    Hx of abnormal bleeding or anti-platelet use: Aspirin 81mg      Past Medical History  History reviewed. No pertinent past medical history.    Past Surgical History  Past Surgical History:   Procedure Laterality Date     CABG AIMEE QUAL ACT PERFORM      CABG x 3 in 2009       Prior to Admission Medications  Current Outpatient Medications   Medication Sig Dispense Refill     aspirin (ASA) 81 MG EC tablet Take 81 mg by mouth daily       atenolol (TENORMIN) 25 MG tablet Take 1 tablet by mouth daily       loratadine (CLARITIN)  10 MG tablet Take 10 mg by mouth daily       losartan (COZAAR) 25 MG tablet Take 25 mg by mouth daily       Magnesium Oxide 250 MG TABS Take 250 mg by mouth every evening       nitroGLYcerin (NITROSTAT) 0.4 MG sublingual tablet Place 0.4 mg under the tongue as needed       rosuvastatin (CRESTOR) 10 MG tablet Take 1 tablet by mouth At Bedtime       tiZANidine (ZANAFLEX) 4 MG tablet TAKE 1 TABLET BY MOUTH IN THE EVENING AS NEEDED FOR BACK PAIN OR SPASM       tolterodine ER (DETROL LA) 4 MG 24 hr capsule Take 1 capsule by mouth daily         Allergies  Allergies   Allergen Reactions     Codeine Nausea and Vomiting     Tylenol #3     Tramadol Dizziness and Nausea and Vomiting     Atorvastatin Rash     Latex Rash     Simvastatin Rash       Social History  Social History     Socioeconomic History     Marital status:      Spouse name: Not on file     Number of children: Not on file     Years of education: Not on file     Highest education level: Not on file   Occupational History     Not on file   Tobacco Use     Smoking status: Never     Smokeless tobacco: Never   Vaping Use     Vaping status: Not on file   Substance and Sexual Activity     Alcohol use: Never     Drug use: Never     Sexual activity: Not on file   Other Topics Concern     Not on file   Social History Narrative     Not on file     Social Determinants of Health     Financial Resource Strain: Not on file   Food Insecurity: Not on file   Transportation Needs: Not on file   Physical Activity: Not on file   Stress: Not on file   Social Connections: Not on file   Intimate Partner Violence: Not on file   Housing Stability: Not on file       Family History  Family History   Problem Relation Age of Onset     Anesthesia Reaction No family hx of      Bleeding Disorder No family hx of      Venous thrombosis No family hx of        Review of Systems  The complete review of systems is negative other than noted in the HPI or here.   Anesthesia Evaluation   Pt has  had prior anesthetic. Type: General.    History of anesthetic complications  - PONV.      ROS/MED HX  ENT/Pulmonary:     (+) sleep apnea, uses CPAP,  (-) tobacco use, asthma, COPD and recent URI   Neurologic:     (+) peripheral neuropathy, - in hands bilaterally.  (-) no seizures and no CVA   Cardiovascular: Comment: Right internal carotid artery 100% occulsion    (+) Dyslipidemia hypertension--CAD -CABG-date: x 3 2009. -Previous cardiac testing   Echo: Date: Results:    Stress Test: Date: 2013 Results:  Summary:                                                                            1. LV function is normal. The visually estimated ejection fraction is 65% at     rest.                                                                              2. Mild left ventricular hypertrophy.                                              3. Normal regional wall motion at rest.                                            4. No anginal symptoms with exercise.                                              5. Dyspnea with exercise wtih normal oxygen saturation on room air with effort.    6. Target heart rate achieved.                                                    7. Normal exercise capacity.                                                      8. Negative stress echo for ischemia or infarction.                                9. Results discussed with patient. CP is associated with chest wall tenderness   of the mid sternum, onset after having right arm yanked suddenly by dog on a       walk. Momentary non-exertional pain with no associated dyspnea or fatigue.         Postional chest wall sx c/w musculoskeletal pain generator. Able to exercise on   treadmill and rowing machine without a CV symptoms.       ECG Reviewed: Date: 1/5/19 Results:  NSR with 1st degree AVB, RBBB  Cath: Date: Results:   (-) KLEIN and orthopnea/PND   METS/Exercise Tolerance: >4 METS Comment: -Able to walk 2 city blocks without stopping. Limited in  "physical activity due to back pain  -Can climb 1 flight of stairs in home without exertional symptoms  -Taking care of wife on hospice in living room.    Hematologic:     (+) history of blood transfusion, no previous transfusion reaction,  (-) history of blood clots and anemia   Musculoskeletal: Comment: -Chronic low back      GI/Hepatic:     (+) GERD (using tums twice a week), Asymptomatic on medication,  (-) liver disease   Renal/Genitourinary: Comment: -Bladder dysfunction on tolterodine   (-) renal disease   Endo:     (+) type II DM, Last HgA1c: 5.7, date: 4/2022,  (-) thyroid disease and chronic steroid usage   Psychiatric/Substance Use:  - neg psychiatric ROS     Infectious Disease:  - neg infectious disease ROS  (-) Recent Fever   Malignancy:  - neg malignancy ROS     Other:      (+) , H/O Chronic Pain,        /78 (BP Location: Right arm, Patient Position: Sitting, Cuff Size: Adult Large)   Pulse 95   Temp 98.5  F (36.9  C) (Oral)   Resp 16   Ht 1.753 m (5' 9\")   Wt 80.7 kg (178 lb)   SpO2 98%   BMI 26.29 kg/m      Physical Exam   Constitutional: Awake, alert, cooperative, no apparent distress, and appears stated age.  Eyes: Pupils equal, round and reactive to light, extra ocular muscles intact, sclera clear, conjunctiva normal.  HENT: Normocephalic, oral pharynx with moist mucus membranes, good dentition. No goiter appreciated.   Respiratory: Clear to auscultation bilaterally, no crackles or wheezing.  Cardiovascular: Regular rate and rhythm, normal S1 and S2, and no murmur noted.  Carotids +2, no bruits. No edema. Palpable pulses to radial  and PT arteries.   GI: Normal bowel sounds, soft, non-distended, non-tender, no masses palpated, no hepatosplenomegaly.   Lymph/Hematologic: No cervical lymphadenopathy and no supraclavicular lymphadenopathy.  Genitourinary:  Deferred.   Skin: Warm and dry.    Musculoskeletal: Full ROM of neck. There is no redness, warmth, or swelling of the joints. Gross " motor strength is normal.    Neurologic: Awake, alert, oriented to name, place and time. Cranial nerves II-XII are grossly intact. Gait is normal.   Neuropsychiatric: Calm, cooperative. Normal affect.     Prior Labs/Diagnostic Studies   All labs and imaging personally reviewed     Carotid Artery Ultrasound (1/17/20):  Summary     * A total occlusion (100%) noted in the RIGHT internal carotid artery.     * A mild lesion noted in the LEFT internal carotid artery estimated at   16-49% stenosis. PSV 92 cm/sec. ICA/CCA ratio 1.21/1.0.     * A >50% obstructive lesion noted in the RIGHT external carotid artery.     * A >50% obstructive lesion noted in the LEFT external carotid artery.     * Moderately elevated Doppler flow velocities noted in the RIGHT proximal   subclavian artery, peak systolic velocity of 324 cm/s, are consistent with a   >50% stenosis.     * No significant change since previous exam on 1/4/2019.   Recommendations     * 12 month ultrasound follow-up.     Carotid Artery Ultrasound (4/28/23):  IMPRESSION:  1. RIGHT ICA: OCCLUDED  2. LEFT ICA:  Less than 50% diameter narrowing by grayscale imaging  and sonographic velocity criteria.  3. Elevated external carotid artery velocities suggest stenosis.  Lumens poorly demonstrated in color Doppler images due to shadowing.  No well established velocity criteria for external carotid arteries.    MRI Spine Cervical (5/1/25):  IMPRESSION:   1.  Degenerative disc disease C4-5 and C5-6. There is a ridge/disc bulge at these levels causing mild canal stenosis.     2. Disc bulge eccentric to the right at C6-7 may impinge the right C7 nerve root.      EKG/ stress test - if available please see in ROS above     The patient's records and results personally reviewed by this provider.     [facility] reviewed [facility]    LAB/DIAGNOSTIC STUDIES TODAY:  Bilateral carotid ultrasound, CBC, BMP, A1C    Component      Latest Ref Rng 4/28/2023  2:51 PM  "  Sodium      136 - 145 mmol/L 135 (L)    Potassium      3.4 - 5.3 mmol/L 4.4    Chloride      98 - 107 mmol/L 100    Carbon Dioxide (CO2)      22 - 29 mmol/L 25    Anion Gap      7 - 15 mmol/L 10    Urea Nitrogen      8.0 - 23.0 mg/dL 20.8    Creatinine      0.67 - 1.17 mg/dL 0.99    Calcium      8.8 - 10.2 mg/dL 9.9    Glucose      70 - 99 mg/dL 101 (H)    GFR Estimate      >60 mL/min/1.73m2 77    WBC      4.0 - 11.0 10e3/uL 9.9    RBC Count      4.40 - 5.90 10e6/uL 4.16 (L)    Hemoglobin      13.3 - 17.7 g/dL 13.4    Hematocrit      40.0 - 53.0 % 39.6 (L)    MCV      78 - 100 fL 95    MCH      26.5 - 33.0 pg 32.2    MCHC      31.5 - 36.5 g/dL 33.8    RDW      10.0 - 15.0 % 12.5    Platelet Count      150 - 450 10e3/uL 250    Hemoglobin A1C      <5.7 % 6.0 (H)       Legend:  (L) Low  (H) High      Assessment      Lake Macias is a 82 year old male seen as a PAC referral for risk assessment and optimization for anesthesia.    Plan/Recommendations  Pt will be optimized for the proposed procedure.  See below for details on the assessment, risk, and preoperative recommendations    NEUROLOGY  - No history of TIA, CVA or seizure  -Post Op delirium risk factors:  Age  - Neuropathy in hands. Per Dr. Howell's note on 4/14/23: \"In the last week he is getting numbness/tingling in both arms/hands and all fingers.  He denies dropping things or using his fingers.  He denies balance issues, weakness in his BUE or bowel/bladder issues.  He denies neck pain but does feel neck stiffness.  His records indicate C4-7 DDD with mild central stenosis at C4-6 in 2015 on MRI (important for anesthesia to know for intubation purposes)\".  Discussed with Dr. Davis and anesthesia resident. Patient was interviewed and assessed by the resident today with no concerning findings. Patient has good neck extension without eliciting worsening neuropathy in upper extremities.     ENT  - No current airway concerns.  Will need to be reassessed day of " surgery.  Mallampati: II  TM: > 3    CARDIAC  - CAD s/p CABG x 3 in 2009. Patient denies any recent cardiac symptoms including CP, chest tightness, or KLEIN. Reports he always carries nitroglycerin with him but has never need to use it. [facility] but still waiting on results. Patient denies any recent cardiac testing within the last few years. Despite his chronic back pain, the patient is able to achieve greater than 4 mets and has 1 point on his RCRI. Discussed with Dr. Davis and no further cardiac testing indicated prior to surgery.   Hold aspirin x 7 days prior to surgery.   - Hypertension managed on atenolol and losartan. BP today is 126/78.   Continue atenolol on DOS.  Hold losartan on DOS.   - Hyperlipidemia   Okay to continue rosuvastatin leading up to DOS.  - Right internal carotid artery stenosis (100% occlusion) and a mild lesion noted in the left internal carotid artery estimated at 16-49% stenosis per carotid ultrasound 1/17/20). This was first noted on cartoid MRA in 2011 and has remained unchanged on follow-up ultrasounds. Patient denies history of strokes or any recent symptoms. Discussed with Dr. Davis with plan to get updated carotid ultrasound prior to surgery.   [ADDENDUM 5/2/23:  Carotid ultrasound on 4/28/23 shows no change from last ultrasound in 2020 (see above). Patient notified of results.]  - METS (Metabolic Equivalents)  Patient performs 4 or more METS exercise without symptoms            Total Score: 0      RCRI-Low risk: Class 2 0.9% complication rate            Total Score: 1    RCRI: CAD        PULMONARY  - Obstructive Sleep Apnea  KAVEH with home CPAP.  Patient will be instructed to bring their home CPAP device to the hospital with them.  - Denies asthma or inhaler use  - Tobacco History      History   Smoking Status     Never   Smokeless Tobacco     Never       GI  - GERD  Controlled  "on medications: Tums   Hold Tums on day of surgery.  PONV Medium Risk  Total Score: 2           1 AN PONV: Patient is not a current smoker    1 AN PONV: Patient has history of PONV        /RENAL  - Baseline Creatinine  0.8  - Bladder dysfunction managed with tolterodine.     ENDOCRINE    - BMI: Estimated body mass index is 26.29 kg/m  as calculated from the following:    Height as of this encounter: 1.753 m (5' 9\").    Weight as of this encounter: 80.7 kg (178 lb).  Overweight (BMI 25.0-29.9)  - Prediabetes.  Last A1c on 4/21/2022 was 5.7, rechecking today.    HEME  VTE Low Risk 0.5%            Total Score: 3    VTE: Greater than 59 yrs old    VTE: Male      - Platelet disfunction second to Aspirin (Michelle, many others)    MSK  - Chronic low back pain - surgery planned as above. Manages with ibuprofen.  Hold ibuprofen x 24 hours prior to surgery.      Different anesthesia methods/types have been discussed with the patient, but they are aware that the final plan will be decided by the assigned anesthesia provider on the date of service.  Patient was discussed with Dr. Davis    The patient is optimized for their procedure.      AVS with information on surgery time/arrival time, meds and NPO status given by nursing staff. No further diagnostic testing indicated.      On the day of service:     Prep time: 20 minutes  Visit time: 20 minutes  Documentation time: 20 minutes  ------------------------------------------  Total time: 60 minutes      WILLOW Elmore CNP  Preoperative Assessment Center  [facility]  Clinic and Surgery Center  Phone: [phone]  Fax: [phone]  "

## 2023-04-28 NOTE — PATIENT INSTRUCTIONS
Preparing for Your Surgery      Name:  Lake Macias   MRN:  [id number]   :  1940   Today's Date:  2023       Arriving for surgery:  Surgery date: 5/15/23  Arrival time:  5.30AM    Please come to:     [facility] [address]  [address] [place]  [address]    -   Parking is available in the Patient Visitor Ramp on [address] and [facility].     -   When entering the hospital please stop at Registration, you will be directed to the 3rd floor Surgery waiting room.     -   Please ask if you need an escort or a wheelchair to the Surgery Waiting Room.  Preop number- [phone]      What can I eat or drink?  -  You may eat and drink normally up to 8 hours prior to arrival time. (Until 9.30PM)  -  You may have clear liquids until 2 hours prior to arrival time. (Until 3.30AM)    Examples of clear liquids:  Water  Clear broth  Juices (apple, white grape, white cranberry  and cider) without pulp  Noncarbonated, powder based beverages  (lemonade and Agapito-Aid)  Sodas (Sprite, 7-Up, ginger ale and seltzer)  Coffee or tea (without milk or cream)  Gatorade    -  No Alcohol for at least 24 hours before surgery.     Which medicines can I take?    Hold Aspirin for 7 days before surgery.   Hold Multivitamins for 7 days before surgery.  Hold Supplements for 7 days before surgery.  Hold Ibuprofen (Advil, Motrin) for 1 day(s) before surgery--unless otherwise directed by surgeon.  Hold Naproxen (Aleve) for 4 days before surgery.    -  DO NOT take these medications the day of surgery:  Losartan (Cozaar).    -  PLEASE TAKE these medications the day of surgery:  Atenolol (Tenormin), Loratidine (Claritin), Nitroglycerin (Nitrostat as needed), Tolterodine (Detrol).    How do I prepare myself?  - Please take 2 showers (one the night prior to surgery and one the morning of surgery) using Scrubcare or Hibiclens soap.    Use this soap only from the neck to your  toes.     Leave the soap on your skin for one minute--then rinse thoroughly.      You may use your own shampoo and conditioner. No other hair products.   - Please remove all jewelry and body piercings.  - No lotions, deodorants or fragrance.  - No makeup or fingernail polish.   - Bring your ID and insurance card.    -If you have a Deep Brain Stimulator, Spinal Cord Stimulator, or any Neuro Stimulator device---you must bring the remote control to the hospital.      ALL PATIENTS GOING HOME THE SAME DAY OF SURGERY ARE REQUIRED TO HAVE A RESPONSIBLE ADULT TO DRIVE AND BE IN ATTENDANCE WITH THEM FOR 24 HOURS FOLLOWING SURGERY.    Covid testing policy as of 12/06/2022  Your surgeon will notify and schedule you for a COVID test if one is needed before surgery--please direct any questions or COVID symptoms to your surgeon      Questions or Concerns:    - For any questions regarding the day of surgery or your hospital stay, please contact the Pre Admission Nursing Office at [phone].       - If you have health changes between today and your surgery, please call your surgeon.       - For questions after surgery, please call your surgeons office.           Current Visitor Guidelines       Surgeries and procedures: Adult patients can have 2 visitors all through the surgery process.     Visiting hours: 8 a.m. to 8:30 p.m.     Hospital: Adult patients and children under age 18 can have 4 visitor at a time       No visitors under the age of 5 are allowed for hospital patients.       Double occupancy rooms: Patients can have only two visitors at a time.       Patients confirmed or suspected to have symptoms of COVID 19 or flu:     No visitors allowed for adult patients.   Children (under age 18) can have 1 named visitor.     People who are sick or showing symptoms of COVID 19 or flu:    Are not allowed to visit patients--we can only make exceptions in special situations.       Please follow these guidelines for your visit:           Please maintain social distance          Masking is optional--however at times you may be asked to wear a mask for the safety of yourself and others     Clean your hands with alcohol hand . Do this when you arrive at and leave the building and patient room,    And again after you touch your mask or anything in the room.     Go directly to and from the room you are visiting.     Stay in the patient s room during your visit. Limit going to other places in the hospital as much as possible     Leave bags and jackets at home or in the car.     For everyone s health, please don t come and go during your visit. That includes for smoking   during your visit.

## 2023-05-02 ENCOUNTER — TELEPHONE (OUTPATIENT)
Dept: SURGERY | Facility: CLINIC | Age: 83
End: 2023-05-02
Payer: COMMERCIAL

## 2023-05-14 ENCOUNTER — ANESTHESIA EVENT (OUTPATIENT)
Dept: SURGERY | Facility: CLINIC | Age: 83
End: 2023-05-14
Payer: COMMERCIAL

## 2023-05-15 ENCOUNTER — ANESTHESIA (OUTPATIENT)
Dept: SURGERY | Facility: CLINIC | Age: 83
End: 2023-05-15
Payer: COMMERCIAL

## 2023-05-15 ENCOUNTER — HOSPITAL ENCOUNTER (OUTPATIENT)
Facility: CLINIC | Age: 83
Discharge: HOME | End: 2023-05-15
Attending: NEUROLOGICAL SURGERY | Admitting: NEUROLOGICAL SURGERY
Payer: COMMERCIAL

## 2023-05-15 ENCOUNTER — APPOINTMENT (OUTPATIENT)
Dept: GENERAL RADIOLOGY | Facility: CLINIC | Age: 83
End: 2023-05-15
Attending: NEUROLOGICAL SURGERY
Payer: COMMERCIAL

## 2023-05-15 VITALS
HEART RATE: 73 BPM | DIASTOLIC BLOOD PRESSURE: 78 MMHG | WEIGHT: 176.37 LBS | SYSTOLIC BLOOD PRESSURE: 107 MMHG | BODY MASS INDEX: 26.12 KG/M2 | HEIGHT: 69 IN | OXYGEN SATURATION: 98 % | TEMPERATURE: 98.4 F | RESPIRATION RATE: 18 BRPM

## 2023-05-15 DIAGNOSIS — Z98.890 S/P LUMBAR DISCECTOMY: Primary | ICD-10-CM

## 2023-05-15 PROCEDURE — 250N000009 HC RX 250: Performed by: REGISTERED NURSE

## 2023-05-15 PROCEDURE — 250N000013 HC RX MED GY IP 250 OP 250 PS 637: Performed by: ANESTHESIOLOGY

## 2023-05-15 PROCEDURE — 360N000084 HC SURGERY LEVEL 4 W/ FLUORO, PER MIN: Performed by: NEUROLOGICAL SURGERY

## 2023-05-15 PROCEDURE — 999N000179 XR SURGERY CARM FLUORO LESS THAN 5 MIN W STILLS: Mod: TC

## 2023-05-15 PROCEDURE — 272N000001 HC OR GENERAL SUPPLY STERILE: Performed by: NEUROLOGICAL SURGERY

## 2023-05-15 PROCEDURE — 250N000025 HC SEVOFLURANE, PER MIN: Performed by: NEUROLOGICAL SURGERY

## 2023-05-15 PROCEDURE — 710N000012 HC RECOVERY PHASE 2, PER MINUTE: Performed by: NEUROLOGICAL SURGERY

## 2023-05-15 PROCEDURE — 272N000004 HC RX 272: Performed by: NEUROLOGICAL SURGERY

## 2023-05-15 PROCEDURE — 370N000017 HC ANESTHESIA TECHNICAL FEE, PER MIN: Performed by: NEUROLOGICAL SURGERY

## 2023-05-15 PROCEDURE — 999N000141 HC STATISTIC PRE-PROCEDURE NURSING ASSESSMENT: Performed by: NEUROLOGICAL SURGERY

## 2023-05-15 PROCEDURE — 63030 LAMOT DCMPRN NRV RT 1 LMBR: CPT | Mod: 50 | Performed by: NEUROLOGICAL SURGERY

## 2023-05-15 PROCEDURE — 250N000011 HC RX IP 250 OP 636: Performed by: ANESTHESIOLOGY

## 2023-05-15 PROCEDURE — 710N000010 HC RECOVERY PHASE 1, LEVEL 2, PER MIN: Performed by: NEUROLOGICAL SURGERY

## 2023-05-15 PROCEDURE — C1763 CONN TISS, NON-HUMAN: HCPCS | Performed by: NEUROLOGICAL SURGERY

## 2023-05-15 PROCEDURE — 250N000011 HC RX IP 250 OP 636: Performed by: REGISTERED NURSE

## 2023-05-15 PROCEDURE — 258N000003 HC RX IP 258 OP 636: Performed by: REGISTERED NURSE

## 2023-05-15 PROCEDURE — 250N000009 HC RX 250: Performed by: NEUROLOGICAL SURGERY

## 2023-05-15 RX ORDER — FENTANYL CITRATE 50 UG/ML
INJECTION, SOLUTION INTRAMUSCULAR; INTRAVENOUS PRN
Status: DISCONTINUED | OUTPATIENT
Start: 2023-05-15 | End: 2023-05-15

## 2023-05-15 RX ORDER — ONDANSETRON 2 MG/ML
INJECTION INTRAMUSCULAR; INTRAVENOUS PRN
Status: DISCONTINUED | OUTPATIENT
Start: 2023-05-15 | End: 2023-05-15

## 2023-05-15 RX ORDER — DEXAMETHASONE SODIUM PHOSPHATE 4 MG/ML
INJECTION, SOLUTION INTRA-ARTICULAR; INTRALESIONAL; INTRAMUSCULAR; INTRAVENOUS; SOFT TISSUE PRN
Status: DISCONTINUED | OUTPATIENT
Start: 2023-05-15 | End: 2023-05-15

## 2023-05-15 RX ORDER — HYDROMORPHONE HCL IN WATER/PF 6 MG/30 ML
0.4 PATIENT CONTROLLED ANALGESIA SYRINGE INTRAVENOUS EVERY 5 MIN PRN
Status: DISCONTINUED | OUTPATIENT
Start: 2023-05-15 | End: 2023-05-15 | Stop reason: HOSPADM

## 2023-05-15 RX ORDER — OXYCODONE HYDROCHLORIDE 5 MG/1
5 TABLET ORAL
Status: DISCONTINUED | OUTPATIENT
Start: 2023-05-15 | End: 2023-05-15 | Stop reason: HOSPADM

## 2023-05-15 RX ORDER — PROPOFOL 10 MG/ML
INJECTION, EMULSION INTRAVENOUS PRN
Status: DISCONTINUED | OUTPATIENT
Start: 2023-05-15 | End: 2023-05-15

## 2023-05-15 RX ORDER — HYDROMORPHONE HCL IN WATER/PF 6 MG/30 ML
0.2 PATIENT CONTROLLED ANALGESIA SYRINGE INTRAVENOUS EVERY 5 MIN PRN
Status: DISCONTINUED | OUTPATIENT
Start: 2023-05-15 | End: 2023-05-15 | Stop reason: HOSPADM

## 2023-05-15 RX ORDER — DEXAMETHASONE SODIUM PHOSPHATE 4 MG/ML
4 INJECTION, SOLUTION INTRA-ARTICULAR; INTRALESIONAL; INTRAMUSCULAR; INTRAVENOUS; SOFT TISSUE
Status: DISCONTINUED | OUTPATIENT
Start: 2023-05-15 | End: 2023-05-15 | Stop reason: HOSPADM

## 2023-05-15 RX ORDER — BUPIVACAINE HYDROCHLORIDE AND EPINEPHRINE 2.5; 5 MG/ML; UG/ML
INJECTION, SOLUTION EPIDURAL; INFILTRATION; INTRACAUDAL; PERINEURAL PRN
Status: DISCONTINUED | OUTPATIENT
Start: 2023-05-15 | End: 2023-05-15 | Stop reason: HOSPADM

## 2023-05-15 RX ORDER — PROPOFOL 10 MG/ML
INJECTION, EMULSION INTRAVENOUS CONTINUOUS PRN
Status: DISCONTINUED | OUTPATIENT
Start: 2023-05-15 | End: 2023-05-15

## 2023-05-15 RX ORDER — CEFAZOLIN SODIUM/WATER 2 G/20 ML
SYRINGE (ML) INTRAVENOUS PRN
Status: DISCONTINUED | OUTPATIENT
Start: 2023-05-15 | End: 2023-05-15

## 2023-05-15 RX ORDER — FENTANYL CITRATE 50 UG/ML
50 INJECTION, SOLUTION INTRAMUSCULAR; INTRAVENOUS EVERY 5 MIN PRN
Status: DISCONTINUED | OUTPATIENT
Start: 2023-05-15 | End: 2023-05-15 | Stop reason: HOSPADM

## 2023-05-15 RX ORDER — ONDANSETRON 2 MG/ML
4 INJECTION INTRAMUSCULAR; INTRAVENOUS EVERY 30 MIN PRN
Status: DISCONTINUED | OUTPATIENT
Start: 2023-05-15 | End: 2023-05-15 | Stop reason: HOSPADM

## 2023-05-15 RX ORDER — SCOLOPAMINE TRANSDERMAL SYSTEM 1 MG/1
1 PATCH, EXTENDED RELEASE TRANSDERMAL ONCE
Status: COMPLETED | OUTPATIENT
Start: 2023-05-15 | End: 2023-05-15

## 2023-05-15 RX ORDER — FENTANYL CITRATE 50 UG/ML
25 INJECTION, SOLUTION INTRAMUSCULAR; INTRAVENOUS EVERY 5 MIN PRN
Status: DISCONTINUED | OUTPATIENT
Start: 2023-05-15 | End: 2023-05-15 | Stop reason: HOSPADM

## 2023-05-15 RX ORDER — ACETAMINOPHEN 325 MG/1
975 TABLET ORAL ONCE
Status: COMPLETED | OUTPATIENT
Start: 2023-05-15 | End: 2023-05-15

## 2023-05-15 RX ORDER — ONDANSETRON 4 MG/1
4 TABLET, ORALLY DISINTEGRATING ORAL EVERY 30 MIN PRN
Status: DISCONTINUED | OUTPATIENT
Start: 2023-05-15 | End: 2023-05-15 | Stop reason: HOSPADM

## 2023-05-15 RX ORDER — OXYCODONE HYDROCHLORIDE 10 MG/1
10 TABLET ORAL
Status: DISCONTINUED | OUTPATIENT
Start: 2023-05-15 | End: 2023-05-15 | Stop reason: HOSPADM

## 2023-05-15 RX ORDER — SODIUM CHLORIDE, SODIUM LACTATE, POTASSIUM CHLORIDE, CALCIUM CHLORIDE 600; 310; 30; 20 MG/100ML; MG/100ML; MG/100ML; MG/100ML
INJECTION, SOLUTION INTRAVENOUS CONTINUOUS PRN
Status: DISCONTINUED | OUTPATIENT
Start: 2023-05-15 | End: 2023-05-15

## 2023-05-15 RX ORDER — OXYCODONE HYDROCHLORIDE 5 MG/1
5 TABLET ORAL EVERY 6 HOURS PRN
Qty: 20 TABLET | Refills: 0 | Status: SHIPPED | OUTPATIENT
Start: 2023-05-15

## 2023-05-15 RX ORDER — LIDOCAINE HYDROCHLORIDE 20 MG/ML
INJECTION, SOLUTION INFILTRATION; PERINEURAL PRN
Status: DISCONTINUED | OUTPATIENT
Start: 2023-05-15 | End: 2023-05-15

## 2023-05-15 RX ORDER — KETOROLAC TROMETHAMINE 30 MG/ML
15 INJECTION, SOLUTION INTRAMUSCULAR; INTRAVENOUS ONCE
Status: COMPLETED | OUTPATIENT
Start: 2023-05-15 | End: 2023-05-15

## 2023-05-15 RX ORDER — SCOLOPAMINE TRANSDERMAL SYSTEM 1 MG/1
1 PATCH, EXTENDED RELEASE TRANSDERMAL ONCE
Status: DISCONTINUED | OUTPATIENT
Start: 2023-05-15 | End: 2023-05-15 | Stop reason: HOSPADM

## 2023-05-15 RX ORDER — SODIUM CHLORIDE, SODIUM LACTATE, POTASSIUM CHLORIDE, CALCIUM CHLORIDE 600; 310; 30; 20 MG/100ML; MG/100ML; MG/100ML; MG/100ML
INJECTION, SOLUTION INTRAVENOUS CONTINUOUS
Status: DISCONTINUED | OUTPATIENT
Start: 2023-05-15 | End: 2023-05-15 | Stop reason: HOSPADM

## 2023-05-15 RX ADMIN — HYDROMORPHONE HYDROCHLORIDE 0.2 MG: 1 INJECTION, SOLUTION INTRAMUSCULAR; INTRAVENOUS; SUBCUTANEOUS at 10:34

## 2023-05-15 RX ADMIN — Medication 10 MG: at 10:27

## 2023-05-15 RX ADMIN — DEXAMETHASONE SODIUM PHOSPHATE 4 MG: 4 INJECTION, SOLUTION INTRA-ARTICULAR; INTRALESIONAL; INTRAMUSCULAR; INTRAVENOUS; SOFT TISSUE at 08:05

## 2023-05-15 RX ADMIN — Medication 8 MG: at 08:29

## 2023-05-15 RX ADMIN — Medication 10 MG: at 08:56

## 2023-05-15 RX ADMIN — LIDOCAINE HYDROCHLORIDE 100 MG: 20 INJECTION, SOLUTION INFILTRATION; PERINEURAL at 07:38

## 2023-05-15 RX ADMIN — HYDROMORPHONE HYDROCHLORIDE 0.3 MG: 1 INJECTION, SOLUTION INTRAMUSCULAR; INTRAVENOUS; SUBCUTANEOUS at 09:35

## 2023-05-15 RX ADMIN — Medication 42 MG: at 07:39

## 2023-05-15 RX ADMIN — SCOPALAMINE 1 PATCH: 1 PATCH, EXTENDED RELEASE TRANSDERMAL at 07:29

## 2023-05-15 RX ADMIN — ONDANSETRON 4 MG: 2 INJECTION INTRAMUSCULAR; INTRAVENOUS at 10:40

## 2023-05-15 RX ADMIN — SODIUM CHLORIDE, POTASSIUM CHLORIDE, SODIUM LACTATE AND CALCIUM CHLORIDE: 600; 310; 30; 20 INJECTION, SOLUTION INTRAVENOUS at 07:33

## 2023-05-15 RX ADMIN — PHENYLEPHRINE HYDROCHLORIDE 0.3 MCG/KG/MIN: 10 INJECTION INTRAVENOUS at 07:36

## 2023-05-15 RX ADMIN — FENTANYL CITRATE 100 MCG: 50 INJECTION, SOLUTION INTRAMUSCULAR; INTRAVENOUS at 07:41

## 2023-05-15 RX ADMIN — FENTANYL CITRATE 25 MCG: 50 INJECTION, SOLUTION INTRAMUSCULAR; INTRAVENOUS at 11:28

## 2023-05-15 RX ADMIN — Medication 2 G: at 07:38

## 2023-05-15 RX ADMIN — KETOROLAC TROMETHAMINE 15 MG: 30 INJECTION, SOLUTION INTRAMUSCULAR; INTRAVENOUS at 11:57

## 2023-05-15 RX ADMIN — PROPOFOL 50 MG: 10 INJECTION, EMULSION INTRAVENOUS at 07:42

## 2023-05-15 RX ADMIN — Medication 10 MG: at 08:46

## 2023-05-15 RX ADMIN — PROPOFOL 20 MCG/KG/MIN: 10 INJECTION, EMULSION INTRAVENOUS at 08:10

## 2023-05-15 RX ADMIN — SUGAMMADEX 200 MG: 100 INJECTION, SOLUTION INTRAVENOUS at 11:04

## 2023-05-15 RX ADMIN — Medication 10 MG: at 09:51

## 2023-05-15 RX ADMIN — PROPOFOL 70 MG: 10 INJECTION, EMULSION INTRAVENOUS at 07:38

## 2023-05-15 RX ADMIN — ACETAMINOPHEN 975 MG: 325 TABLET ORAL at 13:07

## 2023-05-15 ASSESSMENT — ACTIVITIES OF DAILY LIVING (ADL)
ADLS_ACUITY_SCORE: 35
ADLS_ACUITY_SCORE: 36
ADLS_ACUITY_SCORE: 35
ADLS_ACUITY_SCORE: 35

## 2023-05-15 ASSESSMENT — VISUAL ACUITY: OU: NORMAL ACUITY

## 2023-05-15 NOTE — OP NOTE
Procedure Date: 05/15/2023     PREOPERATIVE DIAGNOSIS:    1. 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 Neurogenic claudication     POSTOPERATIVE DIAGNOSIS:    1. 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 Neurogenic claudication     PROCEDURE:    1. Bilateral minimally invasive L4-L5 laminectomy and microdiskectomy  2. Use of intra-operative C-arm  3. Use of microscope      ATTENDING SURGEON:  Allyson Howell MD      RESIDENT SURGEON:  Jose Donato MD      ESTIMATED BLOOD LOSS:  10 mL.      ANESTHESIA:  General.      INDICATIONS FOR PROCEDURE: Lake is a 82 year old man with a history of low back pain and neurogenic claudication. Imaging revealed severe stenosis at L4-5 with large disk herniation. He was offered abovementioned procedure and agreed to sign consent after understanding the risks and alternatives.     DESCRIPTION OF PROCEDURE:  After consent was verified, the patient was brought to the operating theater where general endotracheal anesthesia was then induced.  A Carter catheter was not placed for this procedure.  The patient was then flipped into the prone position on the operating table with júnior frame.  All pressure points were appropriately padded for the duration of the case.  We planned a midline incision about 2cm at the level of L4-L5.  We then prepped and draped in the usual sterile fashion.  We brought the C-arm into the room to verify the L4-L5 level and injected 0.25% of marcaine with epinephrine.  We then performed a timeout and made skin incision down to the fascia on both sides. We started on the right side, and with our finger, we performed blunt dissection down to the lamina and facet joint of L4 and docked the first dilating tube then obtained x-ray to confirm the level.  We then performed serial dilations, with C-arm and secured a #6 tube in place.      Microscope was then brought into the field and we dissected the tissue off the bone with monopolar cautery.  We removed some of the  medial facet joint at L4-5 with high speed drill.  We then proceeded medially and removed the lamina and ligamentum flavum to expose the thecal sac. Then, we proceeded laterally and used a D'Errico retractor to retract the thecal sac and access the disk space at L4-5. We coagulated the epidural venous plexus and used a downbiting curettes to perform a diskectomy and remove disk material with pituitary rongeurs. We removed large disk fragments and felt under the thecal sac to be free. Once we had good decompression on the right side, we removed the tube and moved to the left side. Similarly, we performed serial dilations with C-arm fluoroscopy, and secured our #6 tube in place on the left L4-5 facet joint. We used the high speed drill to drill out the lamina and expose the ligamentum flavum, as well as the medial facet joint. We then retracted the thecal sac and exposed the disk space at L4-5 on the left. As previously done, we used downbiting curettes to perform our microdiskectomy and deliver the disk fragments. During this maneuver, a small CSF leak was encountered. Once we completed our diskectomy and decompression, the CSF leak was controlled with surgicel, gelfoam, and dural sealant. At this point, we turned our attention towards to closing. We irrigated the surgical field.  The fascia was closed with 0 Vicryl, subcutaneous tissue was closed with 2-0 Vicryl, and the skin was closed with a 4-0 monocryl in a subcuticular fashion.  The skin was cleaned with wet and dry sponges and dermabond was applied, followed by sterile dressing.      All sponge counts and needle counts were correct x2 at the end of the procedure.      Dr. Howell scrubbed for the critical portions of the operation and was immediately available for the remainder.     -----------------------------------  Jose Donato MD, MS  Neurosurgery PGY-6  I was present for the critical portions of the operation and immediately available for the remainder.   AMP

## 2023-05-15 NOTE — PROGRESS NOTES
Anesthesia came to bedside to reevaluate patient. Family and patient had stated that the body movement was normal for the patient when the patient is tired.   Patient was cleared for Phase 2    Neuro paged and updated with status and information from patient and family.

## 2023-05-15 NOTE — ANESTHESIA PROCEDURE NOTES
Airway       Patient location during procedure: OR       Procedure Start/Stop Times: 5/15/2023 7:44 AM  Staff -        CRNA: Rohit Wang APRN CRNA       Performed By: CRNA  Consent for Airway        Urgency: elective  Indications and Patient Condition       Indications for airway management: olya-procedural       Induction type:intravenous       Mask difficulty assessment: 1 - vent by mask    Final Airway Details       Final airway type: endotracheal airway       Successful airway: ETT - single and Oral  Endotracheal Airway Details        ETT size (mm): 7.5       Cuffed: yes       Successful intubation technique: direct laryngoscopy       DL Blade Type: MAC 3       Grade View of Cords: 1       Adjucts: stylet       Position: Right       Measured from: lips       Secured at (cm): 22       Bite block used: None    Post intubation assessment        Placement verified by: capnometry and equal breath sounds        Number of attempts at approach: 1       Secured with: pink tape       Ease of procedure: easy       Dentition: Intact and Unchanged    Medication(s) Administered   Medication Administration Time: 5/15/2023 7:44 AM

## 2023-05-15 NOTE — BRIEF OP NOTE
[facility]    Brief Operative Note    Pre-operative diagnosis: Acute bilateral low back pain with bilateral sciatica [M54.42, M54.41]  Post-operative diagnosis Same as pre-operative diagnosis    Procedure: Procedure(s):  Minimally invasive Lumbar 4/5 bilateral microdiscectomy  **Latex Allergy**  Surgeon: Surgeon(s) and Role:     * Allyson Howell MD - Primary     * Jose Donato MD  Anesthesia: General   Estimated Blood Loss: 10 mL from 5/15/2023  7:26 AM to 5/15/2023 11:13 AM      Drains: None  Specimens: * No specimens in log *  Findings: L4-5 bilateral laminectomies and microdiskectomies. Small CSF leak that was controlled.  Complications: None.  Implants: * No implants in log *

## 2023-05-15 NOTE — ANESTHESIA CARE TRANSFER NOTE
Patient: Lake Macias    Procedure: Procedure(s):  Minimally invasive Lumbar 4/5 bilateral microdiscectomy  **Latex Allergy**       Diagnosis: Acute bilateral low back pain with bilateral sciatica [M54.42, M54.41]  Diagnosis Additional Information: No value filed.    Anesthesia Type:   General     Note:    Oropharynx: oropharynx clear of all foreign objects and spontaneously breathing  Level of Consciousness: drowsy  Oxygen Supplementation: nasal cannula  Level of Supplemental Oxygen (L/min / FiO2): 2  Independent Airway: airway patency satisfactory and stable  Dentition: dentition unchanged  Vital Signs Stable: post-procedure vital signs reviewed and stable  Report to RN Given: handoff report given  Patient transferred to: PACU    Handoff Report: Identifed the Patient, Identified the Reponsible Provider, Reviewed the pertinent medical history, Discussed the surgical course, Reviewed Intra-OP anesthesia mangement and issues during anesthesia, Set expectations for post-procedure period and Allowed opportunity for questions and acknowledgement of understanding      Vitals:  Vitals Value Taken Time   /67 05/15/23 1114   Temp     Pulse 70 05/15/23 1120   Resp 6 05/15/23 1120   SpO2 98 % 05/15/23 1120   Vitals shown include unvalidated device data.    Electronically Signed By: WILLOW Aguilar CRNA  May 15, 2023  11:21 AM

## 2023-05-15 NOTE — ANESTHESIA POSTPROCEDURE EVALUATION
"Patient: Lake Macias    Procedure: Procedure(s):  Minimally invasive Lumbar 4/5 bilateral microdiscectomy  **Latex Allergy**       Anesthesia Type:  General    Note:  Disposition: Outpatient   Postop Pain Control: Uneventful            Sign Out: Well controlled pain   PONV: No   Neuro/Psych: Uneventful            Sign Out: Acceptable/Baseline neuro status   Airway/Respiratory: Uneventful            Sign Out: Acceptable/Baseline resp. status   CV/Hemodynamics: Uneventful            Sign Out: Acceptable CV status; No obvious hypovolemia; No obvious fluid overload   Other NRE: NONE   DID A NON-ROUTINE EVENT OCCUR? YES    Event details/Postop Comments:  Brief period of apparent delirium shortly arrival.  Patient appears to be intermittently \"asleep\" and then sudden startle response and confusion.  There repeated itself for as long as I watched (15min).  But felt it better to just allow him to more fully emerge than to introduce other medications.  But time of PACU discharge, much brighter and oriented.  OK to go to Phase II.             Last vitals:  Vitals Value Taken Time   /63 05/15/23 1245   Temp 37.3  C (99.2  F) 05/15/23 1115   Pulse 70 05/15/23 1251   Resp 14 05/15/23 1251   SpO2 98 % 05/15/23 1251   Vitals shown include unvalidated device data.    Electronically Signed By: Zoraida Soto MD  May 15, 2023  12:51 PM  "

## 2023-05-15 NOTE — ANESTHESIA PREPROCEDURE EVALUATION
Anesthesia Pre-Procedure Evaluation    Patient: Lake Macias   MRN: [id number] : 1940        Procedure : Procedure(s):  Minimally invasive Lumbar 4/5 bilateral microdiscectomy  **Latex Allergy**          History reviewed. No pertinent past medical history.   Past Surgical History:   Procedure Laterality Date     CABG AIMEE QUAL ACT PERFORM      CABG x 3 in       Allergies   Allergen Reactions     Codeine Nausea and Vomiting     Tylenol #3     Tramadol Dizziness and Nausea and Vomiting     Atorvastatin Rash     Latex Rash     Simvastatin Rash      Social History     Tobacco Use     Smoking status: Never     Smokeless tobacco: Never   Vaping Use     Vaping status: Not on file   Substance Use Topics     Alcohol use: Never      Wt Readings from Last 1 Encounters:   05/15/23 80 kg (176 lb 5.9 oz)        Anesthesia Evaluation   Pt has had prior anesthetic. Type: General.        ROS/MED HX  ENT/Pulmonary:  - neg pulmonary ROS     Neurologic:       Cardiovascular:     (+) hypertension--CAD -CABG--    METS/Exercise Tolerance: >4 METS    Hematologic:  - neg hematologic  ROS     Musculoskeletal:  - neg musculoskeletal ROS     GI/Hepatic:  - neg GI/hepatic ROS     Renal/Genitourinary:       Endo:  - neg endo ROS     Psychiatric/Substance Use:  - neg psychiatric ROS     Infectious Disease:  - neg infectious disease ROS     Malignancy:  - neg malignancy ROS     Other:  - neg other ROS          Physical Exam    Airway        Mallampati: I   TM distance: > 3 FB   Neck ROM: full   Mouth opening: > 3 cm    Respiratory Devices and Support         Dental       (+) Completely normal teeth      Cardiovascular   cardiovascular exam normal          Pulmonary   pulmonary exam normal                OUTSIDE LABS:  CBC:   Lab Results   Component Value Date    WBC 9.9 2023    HGB 13.4 2023    HCT 39.6 (L) 2023     2023     BMP:   Lab Results   Component Value Date     (L) 2023     POTASSIUM 4.4 04/28/2023    CHLORIDE 100 04/28/2023    CO2 25 04/28/2023    BUN 20.8 04/28/2023    CR 0.99 04/28/2023     (H) 04/28/2023     COAGS: No results found for: PTT, INR, FIBR  POC: No results found for: BGM, HCG, HCGS  HEPATIC: No results found for: ALBUMIN, PROTTOTAL, ALT, AST, GGT, ALKPHOS, BILITOTAL, BILIDIRECT, TERRELL  OTHER:   Lab Results   Component Value Date    A1C 6.0 (H) 04/28/2023    CHAYA 9.9 04/28/2023       Anesthesia Plan    ASA Status:  3      Anesthesia Type: General.         Techniques and Equipment:       - Drips/Meds: Phenylephrine     Consents    Anesthesia Plan(s) and associated risks, benefits, and realistic alternatives discussed. Questions answered and patient/representative(s) expressed understanding.    - Discussed:     - Discussed with:  Patient      - Extended Intubation/Ventilatory Support Discussed: No.      - Patient is DNR/DNI Status: No    Use of blood products discussed: No .     Postoperative Care    Pain management: IV analgesics.   PONV prophylaxis: Ondansetron (or other 5HT-3), Scopolamine patch, Background Propofol Infusion     Comments:    Other Comments: Greatest concern is his carotid disease.  Stroke risk discussed with patient - as well as plans for BP management (target MAP >80 at all times).  Also hx PONV.  Scop patch applied preop.             Syed Mckay MD

## 2023-05-15 NOTE — DISCHARGE INSTRUCTIONS
"[facility]  Same-Day Surgery   Adult Discharge Orders & Instructions     For 24 hours after surgery    Get plenty of rest.  A responsible adult must stay with you for at least 24 hours after you leave the hospital.   Do not drive or use heavy equipment.  If you have weakness or tingling, don't drive or use heavy equipment until this feeling goes away.  Do not drink alcohol.  Avoid strenuous or risky activities.  Ask for help when climbing stairs.   You may feel lightheaded.  IF so, sit for a few minutes before standing.  Have someone help you get up.   If you have nausea (feel sick to your stomach): Drink only clear liquids such as apple juice, ginger ale, broth or 7-Up.  Rest may also help.  Be sure to drink enough fluids.  Move to a regular diet as you feel able.  You may have a slight fever. Call the doctor if your fever is over 100 F (37.7 C) (taken under the tongue) or lasts longer than 24 hours.  You may have a dry mouth, a sore throat, muscle aches or trouble sleeping.  These should go away after 24 hours.  Do not make important or legal decisions.   Call your doctor for any of the followin.  Signs of infection (fever, growing tenderness at the surgery site, a large amount of drainage or bleeding, severe pain, foul-smelling drainage, redness, swelling).    2. It has been over 8 to 10 hours since surgery and you are still not able to urinate (pass water).    3.  Headache for over 24 hours.  To contact a doctor, call Dr Howell at the Neurosurgery Clinic at [phone] or:    '   [phone] and ask for the \"resident on call for Neurosurgery \" (answered 24 hours a day)  '   Emergency Department:    [facility]: [phone]       (TTY for hearing impaired: [phone])  "

## 2023-05-15 NOTE — OR NURSING
Shortly after patient arrived on unit, start to exhibit occasional rapid jerking movements of head, arms. Pt denied chest pain/shortness of breath, endorsed some back pain. Neurosurgery and Anesthesia teams notified, both assessed patient at bedside. Pt received PRN pain medication with some relief of back pain, but jerking movements continued. No neuro deficits noted on assessment, pt awake, alert and oriented at this time. Per Woody GONZALES, and Kinga NICK, plan is to avoid sedating meds d/t concern for possible delirium, continue to monitor.

## 2023-05-30 ENCOUNTER — OFFICE VISIT (OUTPATIENT)
Dept: NEUROSURGERY | Facility: CLINIC | Age: 83
End: 2023-05-30
Payer: COMMERCIAL

## 2023-05-30 VITALS
RESPIRATION RATE: 16 BRPM | BODY MASS INDEX: 25.62 KG/M2 | SYSTOLIC BLOOD PRESSURE: 164 MMHG | HEIGHT: 69 IN | WEIGHT: 173 LBS | HEART RATE: 74 BPM | OXYGEN SATURATION: 99 % | DIASTOLIC BLOOD PRESSURE: 73 MMHG

## 2023-05-30 DIAGNOSIS — M48.062 LUMBAR STENOSIS WITH NEUROGENIC CLAUDICATION: ICD-10-CM

## 2023-05-30 DIAGNOSIS — Z98.890 STATUS POST LUMBAR SURGERY: Primary | ICD-10-CM

## 2023-05-30 PROCEDURE — 99024 POSTOP FOLLOW-UP VISIT: CPT | Performed by: PHYSICIAN ASSISTANT

## 2023-05-30 NOTE — PATIENT INSTRUCTIONS
Follow up in 4 weeks.  No imaging.     Restrictions: no lifting greater than 10 pounds, avoid bending/twisting activities for another 4 weeks.  Then can gradually start to do some slow stretching and increase to 25 pounds lifting.      PT ordered.  They will call you to schedule.  If you do not hear from them in the next few days, call them.

## 2023-05-30 NOTE — LETTER
5/30/2023       RE: Lake Macias  [address]  [place] [facility] [address]     Dear Colleague,    Thank you for referring your patient, Lake Macias, to the [facility]OSURGERY CLINIC [facility]. Please see a copy of my visit note below.        Neurosurgery Clinic Note    Chief Complaint: 2 week post-op visit    DATE OF VISIT: 5/30/2023      Procedure Date: 05/15/2023     PREOPERATIVE DIAGNOSIS:    1. 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 Neurogenic claudication     POSTOPERATIVE DIAGNOSIS:    1. 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 Neurogenic claudication     PROCEDURE:    1. Bilateral minimally invasive L4-L5 laminectomy and microdiskectomy  2. Use of intra-operative C-arm  3. Use of microscope      ATTENDING SURGEON:  Allyson Howell MD      RESIDENT SURGEON:  Jose Donato MD      ESTIMATED BLOOD LOSS:  10 mL.      ANESTHESIA:  General.      INDICATIONS FOR PROCEDURE: Lake is a 82 year old man with a history of low back pain and neurogenic claudication. Imaging revealed severe stenosis at L4-5 with large disk herniation. He was offered abovementioned procedure and agreed to sign consent after understanding the risks and alternatives.    HPI: Lake Macias is a pleasant 82 year old male who is s/p L4/5 MIS lami/microdiskectomy by Dr. Howell on 5/15/23 for neurogenic claudication 2/2 severe stenosis at L4/5.  Closed with Monocryl and dermabond.     Currently, patient is 2 weeks out from surgery.  Patient presents with his son today.  He is using a 4-wheeled walker.  He denies any significant pain and is off narcotic pain medication, taking Tylenol and Ibuprofen as needed.  The severe pain he had prior to surgery has resolved.  He gets some left radicular pain intermittently.  He denies b/b issues.  Only able to ambulate about 10-15 minutes before needing to sit.  This was similar prior to surgery.    Patient denies any  "fevers, chills, wound drainage, or other signs of infection.     Physical Exam   BP (!) 164/73   Pulse 74   Resp 16   Ht 1.753 m (5' 9\")   Wt 78.5 kg (173 lb)   SpO2 99%   BMI 25.55 kg/m      Constitutional: Alert, no acute distress.    [place]:  Ambulates with walker.  Able to easily get in/out of chair.      Neurological:    Strength (L/R)  5/4+ Hip Flexion  5/5 Knee Extension  5/5 Ankle Dorsiflexion  5/5 Extensor Hallucis Longus  5/5 Plantar Flexion    Reflexes (L/R)  2+/2+ Patellar  2+/2+ Ankle    Sensation: SILT    Incision:  Clean, dry and intact.  No erythema, induration or fluctuance.  No drainage noted.  No sutures present, small scab at distal aspect of incision.     IMAGING:  No new imaging.    ASSESSMENT:  Lake Macias is a 82 year old male who is s/p L4/5 MIS lami/microdiskectomy by Dr. Howell on 5/15/23 for neurogenic claudication 2/2 severe stenosis at L4/5.      Patient is about 2 weeks out from surgery.  He is doing well with resolution of severe pain.     PLAN:    You may now use NSAIDs, in additional to Tylenol, muscle relaxer, ice/heat for pain.    Okay to resume aspirin.  Wean off narcotic pain medications such as Oxycodone if you are still taking those.      Okay to shower and get incision wet.  No submerging/soaking it until it is fully healed (around 4 weeks post-op).   Cover portions of incision with a breathable dressing that may get irritated by clothing rubbing on it.    Continue to increase time ambulating, avoid bending, twisting, strenuous exercise, or heavy lifting (greater than 10 pounds). After 6 weeks post-op, can go up to 25 pounds lifting and do light/slow stretching activities.      No driving while using narcotics or other sedating medications, such as sleep aids, muscle relaxants, etc.    Follow up in 4 weeks with me w/o imaging.     Referral placed to PT.  Work on increasing mobility and leg/core strength within restrictions.      Patient is in agreement with this plan " and states no further questions.          Again, thank you for allowing me to participate in the care of your patient.      Sincerely,    Tracie Cormier PA-C

## 2023-05-30 NOTE — PROGRESS NOTES
"    Neurosurgery Clinic Note    Chief Complaint: 2 week post-op visit    DATE OF VISIT: 5/30/2023      Procedure Date: 05/15/2023     PREOPERATIVE DIAGNOSIS:    1. 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 Neurogenic claudication     POSTOPERATIVE DIAGNOSIS:    1. 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 Neurogenic claudication     PROCEDURE:    1. Bilateral minimally invasive L4-L5 laminectomy and microdiskectomy  2. Use of intra-operative C-arm  3. Use of microscope      ATTENDING SURGEON:  Allyson Howell MD      RESIDENT SURGEON:  Jose Donato MD      ESTIMATED BLOOD LOSS:  10 mL.      ANESTHESIA:  General.      INDICATIONS FOR PROCEDURE: Lake is a 82 year old man with a history of low back pain and neurogenic claudication. Imaging revealed severe stenosis at L4-5 with large disk herniation. He was offered abovementioned procedure and agreed to sign consent after understanding the risks and alternatives.    HPI: Lake Macias is a pleasant 82 year old male who is s/p L4/5 MIS lami/microdiskectomy by Dr. Howell on 5/15/23 for neurogenic claudication 2/2 severe stenosis at L4/5.  Closed with Monocryl and dermabond.     Currently, patient is 2 weeks out from surgery.  Patient presents with his son today.  He is using a 4-wheeled walker.  He denies any significant pain and is off narcotic pain medication, taking Tylenol and Ibuprofen as needed.  The severe pain he had prior to surgery has resolved.  He gets some left radicular pain intermittently.  He denies b/b issues.  Only able to ambulate about 10-15 minutes before needing to sit.  This was similar prior to surgery.    Patient denies any fevers, chills, wound drainage, or other signs of infection.     Physical Exam   BP (!) 164/73   Pulse 74   Resp 16   Ht 1.753 m (5' 9\")   Wt 78.5 kg (173 lb)   SpO2 99%   BMI 25.55 kg/m      Constitutional: Alert, no acute distress.    [place]:  Ambulates with walker.  Able to easily get in/out of chair.  "     Neurological:    Strength (L/R)  5/4+ Hip Flexion  5/5 Knee Extension  5/5 Ankle Dorsiflexion  5/5 Extensor Hallucis Longus  5/5 Plantar Flexion    Reflexes (L/R)  2+/2+ Patellar  2+/2+ Ankle    Sensation: SILT    Incision:  Clean, dry and intact.  No erythema, induration or fluctuance.  No drainage noted.  No sutures present, small scab at distal aspect of incision.     IMAGING:  No new imaging.    ASSESSMENT:  Lake Macias is a 82 year old male who is s/p L4/5 MIS lami/microdiskectomy by Dr. Howell on 5/15/23 for neurogenic claudication 2/2 severe stenosis at L4/5.      Patient is about 2 weeks out from surgery.  He is doing well with resolution of severe pain.     PLAN:    You may now use NSAIDs, in additional to Tylenol, muscle relaxer, ice/heat for pain.    Okay to resume aspirin.  Wean off narcotic pain medications such as Oxycodone if you are still taking those.      Okay to shower and get incision wet.  No submerging/soaking it until it is fully healed (around 4 weeks post-op).   Cover portions of incision with a breathable dressing that may get irritated by clothing rubbing on it.    Continue to increase time ambulating, avoid bending, twisting, strenuous exercise, or heavy lifting (greater than 10 pounds). After 6 weeks post-op, can go up to 25 pounds lifting and do light/slow stretching activities.      No driving while using narcotics or other sedating medications, such as sleep aids, muscle relaxants, etc.    Follow up in 4 weeks with me w/o imaging.     Referral placed to PT.  Work on increasing mobility and leg/core strength within restrictions.      Patient is in agreement with this plan and states no further questions.      Tracie Cormier PA-C  Department of Neurosurgery  Office: [phone]

## 2023-07-03 ENCOUNTER — OFFICE VISIT (OUTPATIENT)
Dept: NEUROSURGERY | Facility: CLINIC | Age: 83
End: 2023-07-03
Payer: COMMERCIAL

## 2023-07-03 VITALS
BODY MASS INDEX: 25.65 KG/M2 | WEIGHT: 173.7 LBS | HEART RATE: 73 BPM | OXYGEN SATURATION: 97 % | SYSTOLIC BLOOD PRESSURE: 133 MMHG | DIASTOLIC BLOOD PRESSURE: 70 MMHG

## 2023-07-03 DIAGNOSIS — Z98.890 STATUS POST LUMBAR SURGERY: Primary | ICD-10-CM

## 2023-07-03 PROCEDURE — 99024 POSTOP FOLLOW-UP VISIT: CPT | Performed by: PHYSICIAN ASSISTANT

## 2023-07-03 ASSESSMENT — PAIN SCALES - GENERAL: PAINLEVEL: SEVERE PAIN (6)

## 2023-07-03 NOTE — LETTER
7/3/2023       RE: Lake Macias  [address]  [place] [address]       Dear Colleague,    Thank you for referring your patient, Lake Macias, to the [facility]UROSURGERY CLINIC [facility]. Please see a copy of my visit note below.        Neurosurgery Clinic Note    Chief Complaint: 6 week post-op visit    DATE OF VISIT: 7/3/23      Procedure Date: 05/15/2023     PREOPERATIVE DIAGNOSIS:    1. 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 Neurogenic claudication     POSTOPERATIVE DIAGNOSIS:    1. 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 Neurogenic claudication     PROCEDURE:    1. Bilateral minimally invasive L4-L5 laminectomy and microdiskectomy  2. Use of intra-operative C-arm  3. Use of microscope      ATTENDING SURGEON:  Allyson Howell MD      RESIDENT SURGEON:  Jose Donato MD      ESTIMATED BLOOD LOSS:  10 mL.      ANESTHESIA:  General.      INDICATIONS FOR PROCEDURE: Lake is a 82 year old man with a history of low back pain and neurogenic claudication. Imaging revealed severe stenosis at L4-5 with large disk herniation. He was offered abovementioned procedure and agreed to sign consent after understanding the risks and alternatives.    HPI: Lake Macias is a pleasant 82 year old male who is s/p L4/5 MIS lami/microdiskectomy by Dr. Howell on 5/15/23 for neurogenic claudication 2/2 severe stenosis at L4/5.  Closed with Monocryl and dermabond.     5/30/23-- patient is 2 weeks out from surgery.  Patient presents with his son today.  He is using a 4-wheeled walker.  He denies any significant pain and is off narcotic pain medication, taking Tylenol and Ibuprofen as needed.  The severe pain he had prior to surgery has resolved.  He gets some left radicular pain intermittently.  He denies b/b issues.  Only able to ambulate about 10-15 minutes before needing to sit.  This was similar prior to surgery.    7/3/23 --He needs to go lay  down with doing minimal tasks, standing/sitting for 1 hour at a time.  The pain is not the sharp pain he had prior to surgery, but is in the same location in his hips.  He is currently taking Ibuprofen and Tylenol.  He is frustrated by his progress.  He has not started PT yet because they told him [facility].       Physical Exam   /70 (BP Location: Right arm, Patient Position: Sitting, Cuff Size: Adult Regular)   Pulse 73   Wt 78.8 kg (173 lb 11.2 oz)   SpO2 97%   BMI 25.65 kg/m      Constitutional: Alert, no acute distress.    [place]:  Ambulates with walker.  Able to easily get in/out of chair.    Tightness noted in hips and hamstrings.      Neurological:    Strength (L/R)  5/4+ Hip Flexion  5/5 Knee Extension  5/5 Ankle Dorsiflexion  5/5 Extensor Hallucis Longus  5/5 Plantar Flexion    Reflexes (L/R)  2+/2+ Patellar  2+/2+ Ankle    Sensation: SILT    Incision:  Healing well    IMAGING:  No new imaging.    ASSESSMENT:  Lake Macias is a 82 year old male who is s/p L4/5 MIS lami/microdiskectomy by Dr. Howell on 5/15/23 for neurogenic claudication 2/2 severe stenosis at L4/5.      Patient is about 6 weeks out from surgery.  He is frustrated that he is not making s much progress as he would have liked by this point.  He feels his hip pain is the same as prior to surgery, but he does not have the sharp pain he was having anymore.       I provided reassurance that he is early in his healing process and can expect to improve over the next several months.  PT will be very helpful in that journey.      PLAN:    Continue to increase time ambulating, avoid excessive bending, twisting, strenuous exercise, or heavy lifting (greater than 25 pounds).     Called PT while patient in room and got him scheduled.  [facility] referral [facility] under Media tab, when we do surgery the referral also includes PT.      No driving while using narcotics or other sedating medications, such as sleep aids, muscle relaxants,  etc.    Follow up in 6 weeks with Dr. Howell.  No imaging.      Patient is in agreement with this plan and states no further questions.          Again, thank you for allowing me to participate in the care of your patient.      Sincerely,    Tracie Cormier PA-C

## 2023-07-03 NOTE — PATIENT INSTRUCTIONS
Follow up in 6 weeks with Dr. Howell.      Girma to lift less then 25 pounds and do light stretching activities.      Start PT.

## 2023-07-03 NOTE — PROGRESS NOTES
Neurosurgery Clinic Note    Chief Complaint: 6 week post-op visit    DATE OF VISIT: 7/3/23      Procedure Date: 05/15/2023     PREOPERATIVE DIAGNOSIS:    1. 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 Neurogenic claudication     POSTOPERATIVE DIAGNOSIS:    1. 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 Neurogenic claudication     PROCEDURE:    1. Bilateral minimally invasive L4-L5 laminectomy and microdiskectomy  2. Use of intra-operative C-arm  3. Use of microscope      ATTENDING SURGEON:  Allyson Howell MD      RESIDENT SURGEON:  Jose Donato MD      ESTIMATED BLOOD LOSS:  10 mL.      ANESTHESIA:  General.      INDICATIONS FOR PROCEDURE: Lake is a 82 year old man with a history of low back pain and neurogenic claudication. Imaging revealed severe stenosis at L4-5 with large disk herniation. He was offered abovementioned procedure and agreed to sign consent after understanding the risks and alternatives.    HPI: Lake Macias is a pleasant 82 year old male who is s/p L4/5 MIS lami/microdiskectomy by Dr. Howell on 5/15/23 for neurogenic claudication 2/2 severe stenosis at L4/5.  Closed with Monocryl and dermabond.     5/30/23-- patient is 2 weeks out from surgery.  Patient presents with his son today.  He is using a 4-wheeled walker.  He denies any significant pain and is off narcotic pain medication, taking Tylenol and Ibuprofen as needed.  The severe pain he had prior to surgery has resolved.  He gets some left radicular pain intermittently.  He denies b/b issues.  Only able to ambulate about 10-15 minutes before needing to sit.  This was similar prior to surgery.    7/3/23 --He needs to go lay down with doing minimal tasks, standing/sitting for 1 hour at a time.  The pain is not the sharp pain he had prior to surgery, but is in the same location in his hips.  He is currently taking Ibuprofen and Tylenol.  He is frustrated by his progress.  He has not started PT yet because they told him [facility].       Physical Exam   /70 (BP Location: Right arm, Patient Position: Sitting, Cuff Size: Adult Regular)   Pulse 73   Wt 78.8 kg (173 lb 11.2 oz)   SpO2 97%   BMI 25.65 kg/m      Constitutional: Alert, no acute distress.    [place]:  Ambulates with walker.  Able to easily get in/out of chair.    Tightness noted in hips and hamstrings.      Neurological:    Strength (L/R)  5/4+ Hip Flexion  5/5 Knee Extension  5/5 Ankle Dorsiflexion  5/5 Extensor Hallucis Longus  5/5 Plantar Flexion    Reflexes (L/R)  2+/2+ Patellar  2+/2+ Ankle    Sensation: SILT    Incision:  Healing well    IMAGING:  No new imaging.    ASSESSMENT:  Lake Macias is a 82 year old male who is s/p L4/5 MIS lami/microdiskectomy by Dr. Howell on 5/15/23 for neurogenic claudication 2/2 severe stenosis at L4/5.      Patient is about 6 weeks out from surgery.  He is frustrated that he is not making s much progress as he would have liked by this point.  He feels his hip pain is the same as prior to surgery, but he does not have the sharp pain he was having anymore.       I provided reassurance that he is early in his healing process and can expect to improve over the next several months.  PT will be very helpful in that journey.      PLAN:    Continue to increase time ambulating, avoid excessive bending, twisting, strenuous exercise, or heavy lifting (greater than 25 pounds).     Called PT while patient in room and got him scheduled.  [facility] referral is under [facility] tab, when we do surgery the referral also includes PT.      No driving while using narcotics or other sedating medications, such as sleep aids, muscle relaxants, etc.    Follow up in 6 weeks with Dr. Howell.  No imaging.      Patient is in agreement with this plan and states no further questions.      Tracie Cormier PA-C  Department of Neurosurgery  Office: [phone]

## 2023-07-06 ENCOUNTER — THERAPY VISIT (OUTPATIENT)
Dept: PHYSICAL THERAPY | Facility: CLINIC | Age: 83
End: 2023-07-06
Attending: PHYSICIAN ASSISTANT
Payer: COMMERCIAL

## 2023-07-06 DIAGNOSIS — M48.062 SPINAL STENOSIS OF LUMBAR REGION WITH NEUROGENIC CLAUDICATION: ICD-10-CM

## 2023-07-06 DIAGNOSIS — Z98.890 STATUS POST LUMBAR SURGERY: ICD-10-CM

## 2023-07-06 DIAGNOSIS — M48.062 LUMBAR STENOSIS WITH NEUROGENIC CLAUDICATION: ICD-10-CM

## 2023-07-06 PROCEDURE — 97112 NEUROMUSCULAR REEDUCATION: CPT | Mod: GP | Performed by: PHYSICAL THERAPIST

## 2023-07-06 PROCEDURE — 97161 PT EVAL LOW COMPLEX 20 MIN: CPT | Mod: GP | Performed by: PHYSICAL THERAPIST

## 2023-07-06 PROCEDURE — 97110 THERAPEUTIC EXERCISES: CPT | Mod: GP | Performed by: PHYSICAL THERAPIST

## 2023-07-06 NOTE — PROGRESS NOTES
PHYSICAL THERAPY EVALUATION  Type of Visit: Evaluation    See electronic medical record for Abuse and Falls Screening details.    Subjective      Presenting condition or subjective complaint:    Insidious onset LBP 11/27/22 and radiated into bilateral thighs.  Progressed to surgery 5/15/23 L4/5 MIS laminectomy, microdiskectomy which took away the sharp pains but still having pain to low back to anterior thighs occasionally.  R>L hip pain.  Family tells the patient looks worse now than before the surgery as now doesn't stand up straight.  Date of onset: 11/27/22 (11/27/22 onset, DOS 5/15/23)    Relevant medical history: Sleep disorder like apnea; High blood pressure; Arthritis hearing problem  Dates & types of surgery:  heart bypass surgery 2010    Prior diagnostic imaging/testing results:     before surgery MRI, CT, xray  Prior therapy history for the same diagnosis, illness or injury:    no    Prior Level of Function   Transfers: Independent  Ambulation: Independent  ADL: Independent      Living Environment  Social support: With a significant other or spouse   Type of home: House; 2-story; Multi-level; Basement   Stairs to enter the home: Yes 3 Is there a railing: Yes   Ramp: No   Stairs inside the home: Yes 12 Is there a railing: Yes   Help at home: Self Cares (home health aide/personal care attendant, family, etc); Home and Yard maintenance tasks  Equipment owned: Walker with wheels; Bath bench     Employment: No retired  Hobbies/Interests: yard work, home care jobs    Patient goals for therapy: Be able to do work around the [place] without pain.   Patient's daughter is doing the grocery shopping and has to take care of wife.  Pain assessment: See objective evaluation for additional pain details     Objective       LUMBAR SPINE EVALUATION  PAIN:   Pain Level at worst 7/10  Pain Location: R>L LB, bilateral lateral hips, occasionally to B anterior thighs.  Pain Quality:  Aching, hip feels like bone on bone  pressure  Pain Frequency:  intermittent  Pain is Worst (time of day):  As the day progresses  Pain is Exacerbated By: prolonged sitting, standing, walking 10-15 min tolerance by end of day.  Unable to sleep on L side  Pain is Relieved By:  Lying down flat on back  Pain Progression:  Initial improvement but now unchanged  Paresthesia (yes/no):  No (occasional in fingers)  Associated symptoms: occasional quadricep muscle cramp (occurs much less than before surgery.    ADDITIONAL HISTORY:    Disturbed sleep (yes/no):  no Sleeping postures (prone/sup/side R/L): supine, R side    Specific Questions:  Cough/Sneeze/Strain (pos/neg): negative  Bowel/Bladder (normal/abnormal): normal      INTEGUMENTARY (edema, incisions): incision healed well  POSTURE: fair to poor sitting and standing  GAIT:   Weightbearing Status: WBAT  Assistive Device(s): none  Gait Deviations: Antalgic, Base of support increased, Trunk flexion, Trunk lean L    Weight Bearing Alignment:   Non-Weight Bearing Alignment: Prone lying notable hypertonic R thoracolumbar erector spinae, appears spine rotated R   ROM:   (Degrees) Left AROM Left PROM  Right AROM Right PROM   Hip Flexion       Hip Extension       Hip Abduction       Hip Adduction       Hip Internal Rotation       Hip External Rotation 51  51    Knee Flexion       Knee Extension       Lumbar Side glide No loss Mod loss with R lateral hip pain   Lumbar Flexion Mod loss R rib hump    Lumbar Extension Max loss, no motion     2-3 movements of right sideglide produces pain into R anterior thigh (perpiheralize, W)  Repeated motion assessment not assessed due to post op status    Strength:  MYOTOMES:   Left Right   T12-L3 (Hip Flexion) 5/5 4/5   L2-4 (Quads)  5/5 5/5   L4 (Ankle DF) 5/5 5-/5   L5 (Great Toe Ext) 5/5 5-/5   S1 (Toe Raise) 5/5 5/5     Core strength MMT TA 1/5  DTR S:   CORD SIGNS:   DERMATOMES: Lumbar WNL  NEURAL TENSION:    Left Right   SLR neg neg   SLR with DF     Slump        FLEXIBILITY:  Tight hamstrings (30 degree SLR bilaterally)   LUMBAR/HIP Special Tests:    PELVIS/SI SPECIAL TESTS:   FUNCTIONAL TESTS:   PALPATION:  Hypertonic and tender R thoracolumbar erector spinae  SPINAL SEGMENTAL CONCLUSIONS:     PELVIC/SI SCREEN:     Postural Observation:   Sitting: fair   Change of posture: Better  Standing:     Lateral Shift: potential L lateral shift, stands and walkes in flexion  (patient states this started occurring after surgery)      Static Tests:  prone lying and supine lying decrease, better.        Provisional Classification Other - Post-Surgery      Principle of Management:  Education:  Use of lumbar support, slouch over-correct.  Self MFR/self massage with tennis balls for R erector spinae, practice posture at wall and prone lying.  Supine abdominal exercise #1 and hamstring stretching.                Assessment & Plan   CLINICAL IMPRESSIONS   Medical Diagnosis: s/p L4/5 MIS laminectomy, microdiskectomy with DOS 5/15/23, lumbar stenosis wth neurogenic claudication    Treatment Diagnosis:     Impression/Assessment: Patient is a 82 year old male with lumbar and R>L anterior thigh complaints.  The following significant findings have been identified: Pain, Decreased ROM/flexibility, Decreased strength, Impaired gait, Decreased activity tolerance and Impaired posture. These impairments interfere with their ability to perform self care tasks, recreational activities, household chores, household mobility and community mobility as compared to previous level of function.     Clinical Decision Making (Complexity):   Clinical Presentation: Stable/Uncomplicated  Clinical Presentation Rationale: based on medical and personal factors listed in PT evaluation  Clinical Decision Making (Complexity): Low complexity    PLAN OF CARE  Treatment Interventions:  Interventions: Manual Therapy, Neuromuscular Re-education, Therapeutic Activity, Therapeutic Exercise    Long Term Goals     PT Goal  1  Goal Identifier: ambulation  Goal Description: Patient to walk 20 minutes 0/10 pain  Rationale: to maximize safety and independence with performance of ADLs and functional tasks;to maximize safety and independence within the home;to maximize safety and independence within the community  Goal Progress: Patient reports pain up to 7/10 prevents from walking more than 100 yards.  Patient ambulates flexted and sidebent to the left  Target Date: 09/28/23      Frequency of Treatment: 1x/week  Duration of Treatment: 8 weeks      Education Assessment:        Risks and benefits of evaluation/treatment have been explained.   Patient/Family/caregiver agrees with Plan of Care.     Evaluation Time:     PT Eval, Low Complexity Minutes (26882): 20      Signing Clinician: Bethany Sierra PT

## 2023-07-13 ENCOUNTER — THERAPY VISIT (OUTPATIENT)
Dept: PHYSICAL THERAPY | Facility: CLINIC | Age: 83
End: 2023-07-13
Payer: COMMERCIAL

## 2023-07-13 DIAGNOSIS — M48.062 SPINAL STENOSIS OF LUMBAR REGION WITH NEUROGENIC CLAUDICATION: ICD-10-CM

## 2023-07-13 DIAGNOSIS — Z98.890 STATUS POST LUMBAR SURGERY: Primary | ICD-10-CM

## 2023-07-13 PROCEDURE — 97110 THERAPEUTIC EXERCISES: CPT | Mod: GP | Performed by: PHYSICAL THERAPIST

## 2023-07-13 PROCEDURE — 97112 NEUROMUSCULAR REEDUCATION: CPT | Mod: GP | Performed by: PHYSICAL THERAPIST

## 2023-07-17 ENCOUNTER — THERAPY VISIT (OUTPATIENT)
Dept: PHYSICAL THERAPY | Facility: CLINIC | Age: 83
End: 2023-07-17
Payer: COMMERCIAL

## 2023-07-17 DIAGNOSIS — Z98.890 STATUS POST LUMBAR SURGERY: Primary | ICD-10-CM

## 2023-07-17 DIAGNOSIS — M48.062 SPINAL STENOSIS OF LUMBAR REGION WITH NEUROGENIC CLAUDICATION: ICD-10-CM

## 2023-07-17 PROCEDURE — 97110 THERAPEUTIC EXERCISES: CPT | Mod: GP | Performed by: PHYSICAL THERAPIST

## 2023-07-17 PROCEDURE — 97112 NEUROMUSCULAR REEDUCATION: CPT | Mod: GP | Performed by: PHYSICAL THERAPIST

## 2023-07-24 ENCOUNTER — THERAPY VISIT (OUTPATIENT)
Dept: PHYSICAL THERAPY | Facility: CLINIC | Age: 83
End: 2023-07-24
Payer: COMMERCIAL

## 2023-07-24 DIAGNOSIS — M48.062 SPINAL STENOSIS OF LUMBAR REGION WITH NEUROGENIC CLAUDICATION: ICD-10-CM

## 2023-07-24 DIAGNOSIS — Z98.890 STATUS POST LUMBAR SURGERY: Primary | ICD-10-CM

## 2023-07-24 PROCEDURE — 97110 THERAPEUTIC EXERCISES: CPT | Mod: GP | Performed by: PHYSICAL THERAPIST

## 2023-07-31 ENCOUNTER — THERAPY VISIT (OUTPATIENT)
Dept: PHYSICAL THERAPY | Facility: CLINIC | Age: 83
End: 2023-07-31
Attending: PHYSICIAN ASSISTANT
Payer: COMMERCIAL

## 2023-07-31 DIAGNOSIS — M48.062 SPINAL STENOSIS OF LUMBAR REGION WITH NEUROGENIC CLAUDICATION: ICD-10-CM

## 2023-07-31 DIAGNOSIS — Z98.890 STATUS POST LUMBAR SURGERY: Primary | ICD-10-CM

## 2023-07-31 PROCEDURE — 97110 THERAPEUTIC EXERCISES: CPT | Mod: GP | Performed by: PHYSICAL THERAPIST

## 2023-07-31 PROCEDURE — 97112 NEUROMUSCULAR REEDUCATION: CPT | Mod: GP | Performed by: PHYSICAL THERAPIST

## 2023-08-08 ENCOUNTER — THERAPY VISIT (OUTPATIENT)
Dept: PHYSICAL THERAPY | Facility: CLINIC | Age: 83
End: 2023-08-08
Payer: COMMERCIAL

## 2023-08-08 DIAGNOSIS — M48.062 SPINAL STENOSIS OF LUMBAR REGION WITH NEUROGENIC CLAUDICATION: ICD-10-CM

## 2023-08-08 DIAGNOSIS — Z98.890 STATUS POST LUMBAR SURGERY: Primary | ICD-10-CM

## 2023-08-08 PROCEDURE — 97112 NEUROMUSCULAR REEDUCATION: CPT | Mod: GP | Performed by: PHYSICAL THERAPIST

## 2023-08-08 PROCEDURE — 97110 THERAPEUTIC EXERCISES: CPT | Mod: GP | Performed by: PHYSICAL THERAPIST

## 2023-08-15 ENCOUNTER — TELEPHONE (OUTPATIENT)
Dept: NEUROSURGERY | Facility: CLINIC | Age: 83
End: 2023-08-15
Payer: COMMERCIAL

## 2023-08-15 ENCOUNTER — THERAPY VISIT (OUTPATIENT)
Dept: PHYSICAL THERAPY | Facility: CLINIC | Age: 83
End: 2023-08-15
Payer: COMMERCIAL

## 2023-08-15 DIAGNOSIS — Z98.890 STATUS POST LUMBAR SURGERY: Primary | ICD-10-CM

## 2023-08-15 DIAGNOSIS — M48.062 SPINAL STENOSIS OF LUMBAR REGION WITH NEUROGENIC CLAUDICATION: ICD-10-CM

## 2023-08-15 PROCEDURE — 97112 NEUROMUSCULAR REEDUCATION: CPT | Mod: GP | Performed by: PHYSICAL THERAPIST

## 2023-08-15 PROCEDURE — 97140 MANUAL THERAPY 1/> REGIONS: CPT | Mod: GP | Performed by: PHYSICAL THERAPIST

## 2023-08-15 PROCEDURE — 97110 THERAPEUTIC EXERCISES: CPT | Mod: GP | Performed by: PHYSICAL THERAPIST

## 2023-08-15 NOTE — TELEPHONE ENCOUNTER
[facility] Call Center    Phone Message    May a detailed message be left on voicemail: yes     Reason for Call: Other: Pt is calling and wanting to know when he should make his next appt with . Please call Pt back to discuss.     Action Taken: Message routed to:  [facility] ([facility] Neurosurgery    Travel Screening: Not Applicable

## 2023-08-22 ENCOUNTER — THERAPY VISIT (OUTPATIENT)
Dept: PHYSICAL THERAPY | Facility: CLINIC | Age: 83
End: 2023-08-22
Payer: COMMERCIAL

## 2023-08-22 DIAGNOSIS — M48.062 SPINAL STENOSIS OF LUMBAR REGION WITH NEUROGENIC CLAUDICATION: ICD-10-CM

## 2023-08-22 DIAGNOSIS — Z98.890 STATUS POST LUMBAR SURGERY: Primary | ICD-10-CM

## 2023-08-22 PROCEDURE — 97112 NEUROMUSCULAR REEDUCATION: CPT | Mod: GP | Performed by: PHYSICAL THERAPIST

## 2023-08-22 PROCEDURE — 97110 THERAPEUTIC EXERCISES: CPT | Mod: GP | Performed by: PHYSICAL THERAPIST

## 2023-08-28 ENCOUNTER — THERAPY VISIT (OUTPATIENT)
Dept: PHYSICAL THERAPY | Facility: CLINIC | Age: 83
End: 2023-08-28
Payer: COMMERCIAL

## 2023-08-28 DIAGNOSIS — M48.062 SPINAL STENOSIS OF LUMBAR REGION WITH NEUROGENIC CLAUDICATION: ICD-10-CM

## 2023-08-28 DIAGNOSIS — Z98.890 STATUS POST LUMBAR SURGERY: Primary | ICD-10-CM

## 2023-08-28 PROCEDURE — 97110 THERAPEUTIC EXERCISES: CPT | Mod: GP | Performed by: PHYSICAL THERAPIST

## 2023-08-28 PROCEDURE — 97112 NEUROMUSCULAR REEDUCATION: CPT | Mod: GP | Performed by: PHYSICAL THERAPIST

## 2023-08-29 ENCOUNTER — OFFICE VISIT (OUTPATIENT)
Dept: NEUROSURGERY | Facility: CLINIC | Age: 83
End: 2023-08-29
Payer: COMMERCIAL

## 2023-08-29 VITALS
BODY MASS INDEX: 26.51 KG/M2 | DIASTOLIC BLOOD PRESSURE: 63 MMHG | OXYGEN SATURATION: 100 % | HEIGHT: 69 IN | HEART RATE: 69 BPM | WEIGHT: 179 LBS | SYSTOLIC BLOOD PRESSURE: 106 MMHG

## 2023-08-29 DIAGNOSIS — M48.062 LUMBAR STENOSIS WITH NEUROGENIC CLAUDICATION: Primary | ICD-10-CM

## 2023-08-29 PROCEDURE — 99213 OFFICE O/P EST LOW 20 MIN: CPT | Performed by: NEUROLOGICAL SURGERY

## 2023-08-29 ASSESSMENT — PAIN SCALES - GENERAL: PAINLEVEL: MODERATE PAIN (4)

## 2023-08-29 NOTE — LETTER
8/29/2023       RE: Lake Macias  [address]  [facility] [address]     Dear Colleague,    Thank you for referring your patient, Lake Macias, to the [facility]. Please see a copy of my visit note below.    Dear Doctor,    Mr. Macias was seen in neurosurgery clinic today for a 3 month follow-up. He had a L4/5 MIS laminectomy and microdiscectomy on May 15, 2023 for spinal stenosis and neurogenic claudication.    He has been undergoing PT and doing very well. He is overall happy with the surgery. He has no new neurological deficits today, and his wound appears clean, dry, and well healed.    No new imaging was obtained. He no longer has to follow with me, but has our contact info should he have any further questions or concerns.          Again, thank you for allowing me to participate in the care of your patient.      Sincerely,    Allyson Howell MD

## 2023-08-29 NOTE — PATIENT INSTRUCTIONS
No further follow-up is needed with Neurosurgery.  Thanks you for choosing [facility] for your care.

## 2023-08-29 NOTE — PROGRESS NOTES
Dear Doctor,    Mr. Macias was seen in neurosurgery clinic today for a 3 month follow-up. He had a L4/5 MIS laminectomy and microdiscectomy on May 15, 2023 for spinal stenosis and neurogenic claudication.    He has been undergoing PT and doing very well. He is overall happy with the surgery. He has no new neurological deficits today, and his wound appears clean, dry, and well healed.    No new imaging was obtained. He no longer has to follow with me, but has our contact info should he have any further questions or concerns.    Allyson Howell

## 2023-08-29 NOTE — PROGRESS NOTES
"   08/28/23 0500   Appointment Info   Signing clinician's name / credentials Bethany Sierra DPT   Total/Authorized Visits up to 15 per MD   Visits Used 9   Medical Diagnosis s/p L4/5 MIS laminectomy, microdiskectomy with DOS 5/15/23, lumbar stenosis wth neurogenic claudication   Other pertinent information [facility] order for leg/core strength   Progress Note/Certification   Onset of illness/injury or Date of Surgery 11/27/22  (11/27/22 onset, DOS 5/15/23)   Therapy Frequency 1x/week   Predicted Duration 4 weeks, tapering to 2x/month x 1 month   Progress Note Due Date 08/29/23  (MD visit)   Progress Note Completed Date 08/28/23   PT Goal 1   Goal Identifier ambulation   Goal Description Patient to walk 20 minutes 0/10 pain   Rationale to maximize safety and independence with performance of ADLs and functional tasks;to maximize safety and independence within the home;to maximize safety and independence within the community   Goal Progress Patient reports pain up to 4/10 walking more than 100 yards.  Although can walk 10-15 minutes max will have increased pain.  Patient intermittently ambulates flexed and sidebent to the left although improved with less lateral shift.   Target Date 10/23/23   Subjective Report   Subjective Report Patient and patient's wife are noticing is walking more erect and not leaning to the left anymore unless pain really increases.  Does still get pain with walking 5-15 minutes with 4/10 pain at worst and needs to sit.   walking .  R LB region/hip hurts with ambulation.  Today 1-2/10 pain now but worsens toward the end of the day so still taking 3 Ibuprofen in the morning and at dinner time.   Objective Measures   Objective Measures Objective Measure 1;Objective Measure 2;Objective Measure 3;Objective Measure 4   Objective Measure 1   Objective Measure no longer with L lateral shift, hypertonic R erector spinae.  Has responded to supine flexion rotation with knees to R to treat lateral " shift, today able to progress to lumbar BROWN and EIS   Details Posture  improved, able to stand erect,   Objective Measure 2   Objective Measure LROM/strength   Details flexion min loss, ext max loss extends just past neutral improves to mod/max loss end of session, sideglide L no loss, R no loss. improving gluteal and core strength with HEP but walking still limited.   Objective Measure 3   Objective Measure Posture/muscle   Details No L lateral shift in standing.  Tenderness and tightness R piriformis and hamstring tightness (no pain with SLR, just limited ROM).  No longer has pain R buttock with supine pretzel stretch on R.   Objective Measure 4   Objective Measure hip tests   Details (-) R justin (-) hip scour, (-) FADIR.  Supine L hip ER stretch in 90/90 NO longer provokes buttock pain.  Tight B hamstrings but tigher on R with SLR Flex   Treatment Interventions (PT)   Interventions Therapeutic Procedure/Exercise;Therapeutic Activity;Neuromuscular Re-education   Therapeutic Procedure/Exercise   Therapeutic Procedures: strength, endurance, ROM, flexibillity minutes (84602) 30   Therapeutic Procedures Ther Proc 2;Ther Proc 3;Ther Proc 4;Ther Proc 5   Ther Proc 1 Review keep as active as able but listen to the body, Goal to improve posture, strength and function.  Discuss use of walker to increase walking tolerance- can take a break when need but slowly can progress endurance.  Discuss lifetime stregth exercises for keeping active/healthy, discuss cardio/bike etc good for endurance/health of organs etc.  Review exercises important to keep up with.  Discuss reasoning for extension exercise but need to listen to body's response after and stop if symptoms worsening.   Ther Proc 2 Bridge #2b   Ther Proc 2 - Details #2b 2x 10 challenging, cue alternate   Ther Proc 3 flexion rotation knees to the right   Ther Proc 3 - Details not today, HEP, self stretch   Ther Proc 4 supine ab #4   Ther Proc 4 - Details HEP   Ther Proc 5  supine ab #8   Ther Proc 5 - Details LE tap downs only- x 15 with min cue therapist resist into hip flexion then proper technique.  #8 UE hands clasped and LE x 15   Ther Proc 6 seated hamstring stretch   Ther Proc 6 - Details 30 sec x 2 B, supine by therapist 30 sec x 2   Ther Proc 7 bridge #1 HEP, also staggered feet   Ther Proc 7 - Details #1 HEP, staggered feet 5 sec x 10 B   Ther Proc 8 SL Clamshell   Ther Proc 8 - Details with GTB HEP, more challenging on R   PTRx Ther Proc 1 seated piriformis stretch   PTRx Ther Proc 1 - Details 30 sec x 2   PTRx Ther Proc 2 BROWN   PTRx Ther Proc 2 - Details x 3 min 0/10 pain during and after, increase ext ROM (educate STOP if symptoms lingering worse after when does more at home)   PTRx Ther Proc 3 EIS back leaning against counter   PTRx Ther Proc 3 - Details x 10 NE/NE no pain improve extension ROM.  EDUCATE STOP if symptoms worsening after   Skilled Intervention graded progression of exercise   Patient Response/Progress Improving core stabilization and gluteal strength- able to progress exercises, however standing and walking still provoke pain.  Responds well with improved ROM and no pain with prone extension and EIS today so trial for HEP   Neuromuscular Re-education   Neuromuscular re-ed of mvmt, balance, coord, kinesthetic sense, posture, proprioception minutes (49155) 12   Neuromuscular Re-education Neuro Re-ed 2   Neuro Re-ed 1 sidestepping   Neuro Re-ed 1 - Details along plinth 8 feet back and forth x 3 min   Neuro Re-ed 2 Sit to stand   Neuro Re-ed 2 - Details Review for repeated if able,  Not for repeated if painful, just throughout the day functional motion ensure is controlled.  Review importance of squats for functional strengthening   PTRx Neuro Re-ed 1 Posture Correction with Lumbar Roll   PTRx Neuro Re-ed 1 - Details lumbar support decreases pain   PTRx Neuro Re-ed 2 Neutral Spine Slouch Overcorrect   PTRx Neuro Re-ed 2 - Details x 10 feels ok   PTRx Neuro  Re-ed 3 row with ab set   PTRx Neuro Re-ed 3 - Details standing with ab set GTB discussed   Patient Response/Progress no increased pain today   Manual Therapy   Manual Therapy Manual Therapy 2   Manual Therapy 1 MFR R erector spinae, R piriformis, gluteaus medius   Manual Therapy 1 - Details NOT TODAY prone focus on gluteals  with IR/ER of hip   Manual Therapy 2 long axis distraction   Manual Therapy 2 - Details R LE x not today relieving of cramp   Plan   Home program see ptrx, print ptrx   Updates to plan of care added #8 with UE hands clasped, Bridge #2b, BROWN, EIS back leaning   Plan for next session Assess response to BROWN, EIS and progress accordingly.  Progress core/ LE strengthening,review bridge #2b, sidestepping etc   Comments   Comments Lumbar extension is blocked, today motion improves with extension exercise (despite lumbar stenosis).  trial for HEP with cues to stop if symptoms worsening.   Total Session Time   Timed Code Treatment Minutes 42   Total Treatment Time (sum of timed and untimed services) 42           PLAN  Patient to follow up with MD.  Continue therapy per current plan of care.      Beginning/End Dates of Progress Note Reporting Period:  08/28/23 to 08/28/2023    Referring Provider:  Tracie Cormier

## 2023-09-11 ENCOUNTER — THERAPY VISIT (OUTPATIENT)
Dept: PHYSICAL THERAPY | Facility: CLINIC | Age: 83
End: 2023-09-11
Payer: COMMERCIAL

## 2023-09-11 DIAGNOSIS — M48.062 SPINAL STENOSIS OF LUMBAR REGION WITH NEUROGENIC CLAUDICATION: ICD-10-CM

## 2023-09-11 DIAGNOSIS — Z98.890 STATUS POST LUMBAR SURGERY: Primary | ICD-10-CM

## 2023-09-11 PROCEDURE — 97530 THERAPEUTIC ACTIVITIES: CPT | Mod: GP | Performed by: PHYSICAL THERAPIST

## 2023-09-11 PROCEDURE — 97110 THERAPEUTIC EXERCISES: CPT | Mod: GP | Performed by: PHYSICAL THERAPIST

## 2023-09-28 ENCOUNTER — THERAPY VISIT (OUTPATIENT)
Dept: PHYSICAL THERAPY | Facility: CLINIC | Age: 83
End: 2023-09-28
Payer: COMMERCIAL

## 2023-09-28 DIAGNOSIS — Z98.890 STATUS POST LUMBAR SURGERY: Primary | ICD-10-CM

## 2023-09-28 DIAGNOSIS — M48.062 SPINAL STENOSIS OF LUMBAR REGION WITH NEUROGENIC CLAUDICATION: ICD-10-CM

## 2023-09-28 PROCEDURE — 97112 NEUROMUSCULAR REEDUCATION: CPT | Mod: GP | Performed by: PHYSICAL THERAPIST

## 2023-09-28 PROCEDURE — 97110 THERAPEUTIC EXERCISES: CPT | Mod: GP | Performed by: PHYSICAL THERAPIST

## 2023-12-04 PROBLEM — Z98.890 STATUS POST LUMBAR SURGERY: Status: RESOLVED | Noted: 2023-07-06 | Resolved: 2023-12-04

## 2023-12-04 PROBLEM — M48.062 SPINAL STENOSIS OF LUMBAR REGION WITH NEUROGENIC CLAUDICATION: Status: RESOLVED | Noted: 2023-07-06 | Resolved: 2023-12-04

## 2023-12-04 NOTE — PROGRESS NOTES
"   09/28/23 0500   Appointment Info   Signing clinician's name / credentials Bethany Sierra, DPT, Herman Rojas, SPT   Total/Authorized Visits up to 15 per MD   Visits Used 11   Medical Diagnosis s/p L4/5 MIS laminectomy, microdiskectomy with DOS 5/15/23, lumbar stenosis wth neurogenic claudication   Other pertinent information \"[facility]\" [facility]- MD order for leg/core strength   Quick Adds Student Supervision   Progress Note/Certification   Onset of illness/injury or Date of Surgery 11/27/22  (11/27/22 onset, DOS 5/15/23)   Therapy Frequency 1x/week   Predicted Duration 4 weeks, tapering to 2x/month x 1 month   Progress Note Due Date 08/29/23  (MD visit)   Progress Note Completed Date 08/28/23   PT Goal 1   Goal Identifier ambulation   Goal Description Patient to walk 20 minutes 0/10 pain   Rationale to maximize safety and independence with performance of ADLs and functional tasks;to maximize safety and independence within the home;to maximize safety and independence within the community   Goal Progress Walked 25-30 min max at [employer] with cart up to 4/10 pain, EIS allows patient to walk longer. Patient intermittently ambulates flexed although is neutral in the clinic.   [employer] Date 10/23/23   Subjective Report   Subjective Report 2.5 week lapse in therapy. Patient was able to walk 25-30 min with cart in [employer] for the first time in a long time.  Patient noting feels better after does extension exercise- has increased EIS to 5-6x/day and allows to have less pain.  Got new puppy so has not done as many other strengthening exercises.  3/10 pain today.   Objective Measures   Objective Measures Objective Measure 1;Objective Measure 2;Objective Measure 3;Objective Measure 4   Objective Measure 1   Objective Measure no longer with L lateral shift, hypertonic R erector spinae.  Has responded to supine flexion rotation with knees to R to treat lateral shift, today able to progress to lumbar BROWN and EIS   Details Posture  improved, " able to stand erect,   Objective Measure 2   Objective Measure LROM/strength   Details flexion min loss, ext max loss extends just past neutral improves to mod/max loss end of session, sideglide L no loss, R no loss. improving gluteal and core strength with HEP but walking still limited.   Objective Measure 3   Objective Measure Posture/muscle   Details No L lateral shift in standing.  Tenderness and tightness R piriformis and hamstring tightness (no pain with SLR, just limited ROM).  No longer has pain R buttock with supine pretzel stretch on R.   Objective Measure 4   Objective Measure hip tests   Details (-) R justin (-) hip scour, (-) FADIR.  Supine L hip ER stretch in 90/90 NO longer provokes buttock pain.  Tight B hamstrings but tigher on R with SLR Flex   Treatment Interventions (PT)   Interventions Therapeutic Procedure/Exercise;Therapeutic Activity;Neuromuscular Re-education   Therapeutic Procedure/Exercise   Therapeutic Procedures: strength, endurance, ROM, flexibillity minutes (47456) 30   Therapeutic Procedures Ther Proc 2;Ther Proc 3;Ther Proc 4;Ther Proc 5;Ther Proc 6;Ther Proc 7;Ther Proc 8;Ther Proc 9   Ther Proc 1 Discuss lifetime stregth exercises for keeping active/healthy, discuss cardio/bike etc good for endurance/health of organs etc.  Review reasoning for extension exercise but need to listen to body's response after and stop if symptoms worsening.  Discuss reps more frequent throughout the day (every 2-3 hours) or prn for relief.  Discuss reps instead of sustain.   Ther Proc 2 Bridge #2b   Ther Proc 2 - Details #2b 2x 10 challenging, cue gluteal activation   Ther Proc 3 flexion rotation knees to the right   Ther Proc 3 - Details not today, HEP, self stretch   Ther Proc 4 supine ab #4   Ther Proc 4 - Details HEP   Ther Proc 5 supine ab #8   Ther Proc 5 - Details #8 UE hands clasped and LE x 15 max cues for coordination   Ther Proc 6 seated hamstring stretch   Ther Proc 6 - Details 30 sec x 2 B  review, supine by therapist not today   Ther Proc 7 bridge #1 HEP, also staggered feet   Ther Proc 7 - Details #1 HEP, staggered feet HEP   Ther Proc 8 SL Clamshell   Ther Proc 8 - Details with GTBx 40 B, more challenging on R   PTRx Ther Proc 1 seated piriformis stretch   PTRx Ther Proc 1 - Details HEP   PTRx Ther Proc 2 BROWN   PTRx Ther Proc 2 - Details Patient has been doing and feels good however caution to NOT do sustained hold if symptoms increase after   PTRx Ther Proc 3 EIS back leaning against counter   PTRx Ther Proc 3 - Details at home patient holding 10 sec x 5 then calf strain after, so educate utilize reps x 10 NE/NE no pain improve extension ROM.   After Prone ext EIS x 10 decrease, better Review STOP if symptoms worsening after (don't sustain focus reps and increase frequency every 2-3 hours or prn for relief)   Skilled Intervention graded progression of exercise   Patient Response/Progress EIS decrease, better, with sustained ext hold (patient had done this way at home) in  standing produce calf strain.  EIS with reps then lessened pain again.  After sidestepping pain to calves, EIS NE/NE, sitting and FISItting decreased sypmtoms.  No pain end of treatment   Ther Proc 9 FISitting   Ther Proc 9 - Details 5 sec x 5 relieves B calf strain   Therapeutic Activity   Therapeutic Activities: dynamic activities to improve functional performance minutes (80998) 4   Therapeutic Activities Ther Act 2;Ther Act 3   Ther Act 1 Review and practice x 5 use of golfer's lift and squat lift keeping neutral spine, also practice for assisting with new dog (put food bowl down, pick items up etc)   Ther Act 2 Review squat lift with neutral spine and avoid repetitive bending to prevent pain, especially with new puppy.   Skilled Intervention body mechancis training to minimize LB strain   Patient Response/Progress no increased pain   Neuromuscular Re-education   Neuromuscular re-ed of mvmt, balance, coord, kinesthetic sense,  posture, proprioception minutes (24465) 8   Neuromuscular Re-education Neuro Re-ed 2   Neuro Re-ed 1 sidestepping   Neuro Re-ed 1 - Details 30 feet x 2  harder stepping to the left   Neuro Re-ed 2 Sit to stand   Neuro Re-ed 2 - Details Review for repeated if able,  Not for repeated if painful, just throughout the day functional motion ensure is controlled.  Review importance of squats for functional strengthening   PTRx Neuro Re-ed 1 Posture Correction with Lumbar Roll   PTRx Neuro Re-ed 1 - Details lumbar support decreases pain   PTRx Neuro Re-ed 2 Neutral Spine Slouch Overcorrect   PTRx Neuro Re-ed 2 - Details x 10 feels ok   PTRx Neuro Re-ed 3 row with ab set   PTRx Neuro Re-ed 3 - Details standing with ab set GTB HEP   Manual Therapy   Manual Therapy Manual Therapy 2   Manual Therapy 1 MFR R erector spinae, R piriformis, gluteaus medius   Manual Therapy 1 - Details NOT TODAY prone focus on gluteals  with IR/ER of hip   Manual Therapy 2 long axis distraction   Manual Therapy 2 - Details R LE x not today   Plan   Home program see ptrx, print ptrx   Updates to plan of care EIS reps, not sustained.  Progress HEP   Plan for next session Review/assess response to EIS reps. Practice lifting- squat lift, golfer's lift etc. Progress core/ LE strengthening   Total Session Time   Timed Code Treatment Minutes 42   Total Treatment Time (sum of timed and untimed services) 42       DISCHARGE  Reason for Discharge: Patient did not return to treatment after 9/28/23 so will discharge with independent HEP.    Discharge Plan: Patient to continue home program.    Referring Provider:  Tracie Cormier

## 2024-04-08 NOTE — TELEPHONE ENCOUNTER
"Updated [place] of his carotid US results.  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 the results of his carotid ultrasound on Friday were unchanged from the last ultrasound in 2020 and that he can proceed with surgery.\"  [place] expressed understanding.  Alesia Hernandez RN  "
Home
no

## (undated) DEVICE — LINEN TOWEL PACK X30 5481

## (undated) DEVICE — DRAPE SHEET REV FOLD 3/4 9349

## (undated) DEVICE — LINEN TOWEL PACK X6 WHITE 5487

## (undated) DEVICE — GLOVE BIOGEL PI MICRO INDICATOR UNDERGLOVE SZ 7.0 48970

## (undated) DEVICE — SURGICEL HEMOSTAT 4X8" 1952

## (undated) DEVICE — DRAPE STERI TOWEL LG 1010

## (undated) DEVICE — LABEL MEDICATION SYSTEM 3303-P

## (undated) DEVICE — NDL BLUNT 17GA 1.5" 8881202330

## (undated) DEVICE — ESU ELEC BLADE 6" COATED E1450-6

## (undated) DEVICE — PREP POVIDONE-IODINE 10% SOLUTION 4OZ BOTTLE MDS093944

## (undated) DEVICE — NDL SPINAL 18GA 3.5" 405184

## (undated) DEVICE — DRAPE POUCH INSTRUMENT 1018

## (undated) DEVICE — SOL WATER IRRIG 1000ML BOTTLE 2F7114

## (undated) DEVICE — SPONGE COTTONOID 1/2X1/2" 80-1400

## (undated) DEVICE — SU VICRYL 0 UR-6 27" J603H

## (undated) DEVICE — DRSG PRIMAPORE 03 1/8X6" 66000318

## (undated) DEVICE — ESU FCP CODMAN 9"X1.0MM VERSATRU 9009100SL

## (undated) DEVICE — KNIFE MEDT BAYONETTED 134MM 1563-00

## (undated) DEVICE — SPONGE SURGIFOAM 01GM POWDER 1978

## (undated) DEVICE — DRAPE MICROSCOPE LEICA 54X120" 09-MK653

## (undated) DEVICE — WIPES FOLEY CARE SURESTEP PROVON DFC100

## (undated) DEVICE — SU MONOCRYL 4-0 PS-2 27" UND Y426H

## (undated) DEVICE — DRAPE C-ARM W/STRAPS 42X72" 07-CA104

## (undated) DEVICE — SU DERMABOND ADVANCED .7ML DNX12

## (undated) DEVICE — ESU ELEC BLADE E-SEP INSULATED NEPTUNE 165MM 0703-165-002

## (undated) DEVICE — PREP CHLORAPREP CLEAR 3ML 930400

## (undated) DEVICE — PREP POVIDONE-IODINE 7.5% SCRUB 4OZ BOTTLE MDS093945

## (undated) DEVICE — SYR 30ML LL W/O NDL 302832

## (undated) DEVICE — DRAPE MAYO STAND 23X54 8337

## (undated) DEVICE — PACK NEURO MINOR UMMC SNE32MNMU4

## (undated) DEVICE — SUCTION MANIFOLD NEPTUNE 2 SYS 4 PORT 0702-020-000

## (undated) DEVICE — PREP SKIN SCRUB TRAY 4461A

## (undated) DEVICE — SPONGE SURGIFOAM 100 1974

## (undated) DEVICE — SPONGE COTTONOID NEURO 1/2"X1/2" 30-054

## (undated) DEVICE — ESU ELEC BLADE 6" COATED/INSULATED E1455-6

## (undated) DEVICE — SOL NACL 0.9% IRRIG 1000ML BOTTLE 2F7124

## (undated) DEVICE — GLOVE BIOGEL PI MICRO SZ 7.0 48570

## (undated) DEVICE — SU VICRYL 2-0 CT-2 CR 8X18" J726D

## (undated) DEVICE — CUP AND LID 2PK 2OZ STERILE  SSK9006A

## (undated) DEVICE — BUR STRK PRECISION MATCH HEAD 3.0MM 8450-107-530

## (undated) RX ORDER — ONDANSETRON 2 MG/ML
INJECTION INTRAMUSCULAR; INTRAVENOUS
Status: DISPENSED
Start: 2023-05-15

## (undated) RX ORDER — ACETAMINOPHEN 325 MG/1
TABLET ORAL
Status: DISPENSED
Start: 2023-05-15

## (undated) RX ORDER — GLYCOPYRROLATE 0.2 MG/ML
INJECTION, SOLUTION INTRAMUSCULAR; INTRAVENOUS
Status: DISPENSED
Start: 2023-05-15

## (undated) RX ORDER — BUPIVACAINE HYDROCHLORIDE AND EPINEPHRINE 2.5; 5 MG/ML; UG/ML
INJECTION, SOLUTION EPIDURAL; INFILTRATION; INTRACAUDAL; PERINEURAL
Status: DISPENSED
Start: 2023-05-15

## (undated) RX ORDER — DEXAMETHASONE SODIUM PHOSPHATE 4 MG/ML
INJECTION, SOLUTION INTRA-ARTICULAR; INTRALESIONAL; INTRAMUSCULAR; INTRAVENOUS; SOFT TISSUE
Status: DISPENSED
Start: 2023-05-15

## (undated) RX ORDER — SCOLOPAMINE TRANSDERMAL SYSTEM 1 MG/1
PATCH, EXTENDED RELEASE TRANSDERMAL
Status: DISPENSED
Start: 2023-05-15

## (undated) RX ORDER — KETOROLAC TROMETHAMINE 30 MG/ML
INJECTION, SOLUTION INTRAMUSCULAR; INTRAVENOUS
Status: DISPENSED
Start: 2023-05-15

## (undated) RX ORDER — PROPOFOL 10 MG/ML
INJECTION, EMULSION INTRAVENOUS
Status: DISPENSED
Start: 2023-05-15

## (undated) RX ORDER — HYDROMORPHONE HYDROCHLORIDE 1 MG/ML
INJECTION, SOLUTION INTRAMUSCULAR; INTRAVENOUS; SUBCUTANEOUS
Status: DISPENSED
Start: 2023-05-15

## (undated) RX ORDER — FENTANYL CITRATE-0.9 % NACL/PF 10 MCG/ML
PLASTIC BAG, INJECTION (ML) INTRAVENOUS
Status: DISPENSED
Start: 2023-05-15

## (undated) RX ORDER — FENTANYL CITRATE 50 UG/ML
INJECTION, SOLUTION INTRAMUSCULAR; INTRAVENOUS
Status: DISPENSED
Start: 2023-05-15